# Patient Record
Sex: MALE | Race: BLACK OR AFRICAN AMERICAN | NOT HISPANIC OR LATINO | Employment: FULL TIME | RURAL
[De-identification: names, ages, dates, MRNs, and addresses within clinical notes are randomized per-mention and may not be internally consistent; named-entity substitution may affect disease eponyms.]

---

## 2022-04-09 ENCOUNTER — OFFICE VISIT (OUTPATIENT)
Dept: FAMILY MEDICINE | Facility: CLINIC | Age: 52
End: 2022-04-09
Payer: COMMERCIAL

## 2022-04-09 VITALS
SYSTOLIC BLOOD PRESSURE: 159 MMHG | TEMPERATURE: 99 F | HEART RATE: 68 BPM | OXYGEN SATURATION: 99 % | WEIGHT: 229.38 LBS | BODY MASS INDEX: 34.76 KG/M2 | HEIGHT: 68 IN | DIASTOLIC BLOOD PRESSURE: 93 MMHG

## 2022-04-09 DIAGNOSIS — K59.09 OTHER CONSTIPATION: ICD-10-CM

## 2022-04-09 DIAGNOSIS — M54.42 ACUTE MIDLINE LOW BACK PAIN WITH LEFT-SIDED SCIATICA: Primary | ICD-10-CM

## 2022-04-09 PROCEDURE — 99203 OFFICE O/P NEW LOW 30 MIN: CPT | Mod: 25,,, | Performed by: FAMILY MEDICINE

## 2022-04-09 PROCEDURE — 3080F DIAST BP >= 90 MM HG: CPT | Mod: ,,, | Performed by: FAMILY MEDICINE

## 2022-04-09 PROCEDURE — 1159F PR MEDICATION LIST DOCUMENTED IN MEDICAL RECORD: ICD-10-PCS | Mod: ,,, | Performed by: FAMILY MEDICINE

## 2022-04-09 PROCEDURE — 3080F PR MOST RECENT DIASTOLIC BLOOD PRESSURE >= 90 MM HG: ICD-10-PCS | Mod: ,,, | Performed by: FAMILY MEDICINE

## 2022-04-09 PROCEDURE — 99051 MED SERV EVE/WKEND/HOLIDAY: CPT | Mod: ,,, | Performed by: FAMILY MEDICINE

## 2022-04-09 PROCEDURE — 99051 PR MEDICAL SERVICES, EVE/WKEND/HOLIDAY: ICD-10-PCS | Mod: ,,, | Performed by: FAMILY MEDICINE

## 2022-04-09 PROCEDURE — 1159F MED LIST DOCD IN RCRD: CPT | Mod: ,,, | Performed by: FAMILY MEDICINE

## 2022-04-09 PROCEDURE — 99203 PR OFFICE/OUTPT VISIT, NEW, LEVL III, 30-44 MIN: ICD-10-PCS | Mod: 25,,, | Performed by: FAMILY MEDICINE

## 2022-04-09 PROCEDURE — 3008F PR BODY MASS INDEX (BMI) DOCUMENTED: ICD-10-PCS | Mod: ,,, | Performed by: FAMILY MEDICINE

## 2022-04-09 PROCEDURE — 96372 PR INJECTION,THERAP/PROPH/DIAG2ST, IM OR SUBCUT: ICD-10-PCS | Mod: ,,, | Performed by: FAMILY MEDICINE

## 2022-04-09 PROCEDURE — 3077F PR MOST RECENT SYSTOLIC BLOOD PRESSURE >= 140 MM HG: ICD-10-PCS | Mod: ,,, | Performed by: FAMILY MEDICINE

## 2022-04-09 PROCEDURE — 96372 THER/PROPH/DIAG INJ SC/IM: CPT | Mod: ,,, | Performed by: FAMILY MEDICINE

## 2022-04-09 PROCEDURE — 3077F SYST BP >= 140 MM HG: CPT | Mod: ,,, | Performed by: FAMILY MEDICINE

## 2022-04-09 PROCEDURE — 3008F BODY MASS INDEX DOCD: CPT | Mod: ,,, | Performed by: FAMILY MEDICINE

## 2022-04-09 RX ORDER — PREDNISONE 20 MG/1
TABLET ORAL
Qty: 10 TABLET | Refills: 0 | Status: ON HOLD | OUTPATIENT
Start: 2022-04-09 | End: 2023-01-01

## 2022-04-09 RX ORDER — DEXAMETHASONE SODIUM PHOSPHATE 4 MG/ML
6 INJECTION, SOLUTION INTRA-ARTICULAR; INTRALESIONAL; INTRAMUSCULAR; INTRAVENOUS; SOFT TISSUE
Status: COMPLETED | OUTPATIENT
Start: 2022-04-09 | End: 2022-04-09

## 2022-04-09 RX ORDER — TRAMADOL HYDROCHLORIDE 50 MG/1
50 TABLET ORAL EVERY 6 HOURS
Qty: 15 TABLET | Refills: 0 | Status: ON HOLD | OUTPATIENT
Start: 2022-04-09 | End: 2023-01-01

## 2022-04-09 RX ORDER — TIZANIDINE 4 MG/1
TABLET ORAL
Qty: 30 TABLET | Refills: 2 | Status: ON HOLD | OUTPATIENT
Start: 2022-04-09 | End: 2023-01-01

## 2022-04-09 RX ADMIN — DEXAMETHASONE SODIUM PHOSPHATE 6 MG: 4 INJECTION, SOLUTION INTRA-ARTICULAR; INTRALESIONAL; INTRAMUSCULAR; INTRAVENOUS; SOFT TISSUE at 07:04

## 2023-01-01 ENCOUNTER — ANESTHESIA EVENT (OUTPATIENT)
Dept: SURGERY | Facility: HOSPITAL | Age: 53
DRG: 356 | End: 2023-01-01

## 2023-01-01 ENCOUNTER — ANESTHESIA (OUTPATIENT)
Dept: SURGERY | Facility: HOSPITAL | Age: 53
DRG: 356 | End: 2023-01-01

## 2023-01-01 ENCOUNTER — OFFICE VISIT (OUTPATIENT)
Dept: PRIMARY CARE CLINIC | Facility: CLINIC | Age: 53
End: 2023-01-01
Payer: COMMERCIAL

## 2023-01-01 ENCOUNTER — LAB VISIT (OUTPATIENT)
Dept: PRIMARY CARE CLINIC | Facility: CLINIC | Age: 53
End: 2023-01-01

## 2023-01-01 ENCOUNTER — HOSPITAL ENCOUNTER (OUTPATIENT)
Dept: RADIOLOGY | Facility: HOSPITAL | Age: 53
Discharge: HOME | End: 2023-04-10
Attending: NURSE PRACTITIONER

## 2023-01-01 ENCOUNTER — OFFICE VISIT (OUTPATIENT)
Dept: ORTHOPEDICS | Facility: CLINIC | Age: 53
End: 2023-01-01
Payer: COMMERCIAL

## 2023-01-01 ENCOUNTER — HOSPITAL ENCOUNTER (OUTPATIENT)
Dept: RADIOLOGY | Facility: HOSPITAL | Age: 53
Discharge: HOME | End: 2023-04-12
Attending: ORTHOPAEDIC SURGERY
Payer: COMMERCIAL

## 2023-01-01 ENCOUNTER — CLINICAL SUPPORT (OUTPATIENT)
Dept: PRIMARY CARE CLINIC | Facility: CLINIC | Age: 53
End: 2023-01-01

## 2023-01-01 ENCOUNTER — HOSPITAL ENCOUNTER (INPATIENT)
Facility: HOSPITAL | Age: 53
LOS: 1 days | DRG: 356 | End: 2023-04-22
Attending: INTERNAL MEDICINE | Admitting: INTERNAL MEDICINE

## 2023-01-01 VITALS
BODY MASS INDEX: 34.71 KG/M2 | OXYGEN SATURATION: 98 % | TEMPERATURE: 98 F | SYSTOLIC BLOOD PRESSURE: 126 MMHG | RESPIRATION RATE: 20 BRPM | HEIGHT: 68 IN | HEART RATE: 60 BPM | WEIGHT: 229 LBS | DIASTOLIC BLOOD PRESSURE: 72 MMHG

## 2023-01-01 VITALS
BODY MASS INDEX: 35.61 KG/M2 | HEART RATE: 169 BPM | WEIGHT: 235 LBS | DIASTOLIC BLOOD PRESSURE: 96 MMHG | OXYGEN SATURATION: 71 % | SYSTOLIC BLOOD PRESSURE: 147 MMHG | HEIGHT: 68 IN | TEMPERATURE: 92 F | RESPIRATION RATE: 15 BRPM

## 2023-01-01 VITALS — BODY MASS INDEX: 34.86 KG/M2 | HEIGHT: 68 IN | WEIGHT: 230 LBS

## 2023-01-01 DIAGNOSIS — S82.892A CLOSED FRACTURE OF LEFT ANKLE, INITIAL ENCOUNTER: Primary | ICD-10-CM

## 2023-01-01 DIAGNOSIS — R07.9 CHEST PAIN: ICD-10-CM

## 2023-01-01 DIAGNOSIS — R10.9 ABDOMINAL PAIN, UNSPECIFIED ABDOMINAL LOCATION: ICD-10-CM

## 2023-01-01 DIAGNOSIS — I74.9 EMBOLIC INFARCTION: ICD-10-CM

## 2023-01-01 DIAGNOSIS — N17.9 AKI (ACUTE KIDNEY INJURY): ICD-10-CM

## 2023-01-01 DIAGNOSIS — Z02.83 ENCOUNTER FOR DRUG SCREENING: Primary | ICD-10-CM

## 2023-01-01 DIAGNOSIS — S82.65XA CLOSED NONDISPLACED FRACTURE OF LATERAL MALLEOLUS OF LEFT FIBULA, INITIAL ENCOUNTER: ICD-10-CM

## 2023-01-01 DIAGNOSIS — E87.20 LACTIC ACIDOSIS: ICD-10-CM

## 2023-01-01 DIAGNOSIS — S82.65XA CLOSED NONDISPLACED FRACTURE OF LATERAL MALLEOLUS OF LEFT FIBULA, INITIAL ENCOUNTER: Primary | ICD-10-CM

## 2023-01-01 DIAGNOSIS — Z02.89 ENCOUNTER FOR PHYSICAL EXAMINATION RELATED TO EMPLOYMENT: Primary | ICD-10-CM

## 2023-01-01 DIAGNOSIS — S93.402A MODERATE LEFT ANKLE SPRAIN, INITIAL ENCOUNTER: ICD-10-CM

## 2023-01-01 DIAGNOSIS — S93.402A MODERATE LEFT ANKLE SPRAIN, INITIAL ENCOUNTER: Primary | ICD-10-CM

## 2023-01-01 DIAGNOSIS — Z02.83 ENCOUNTER FOR DRUG SCREENING: ICD-10-CM

## 2023-01-01 DIAGNOSIS — I26.99 BILATERAL PULMONARY EMBOLISM: Primary | ICD-10-CM

## 2023-01-01 DIAGNOSIS — R73.9 HYPERGLYCEMIA, UNSPECIFIED: ICD-10-CM

## 2023-01-01 DIAGNOSIS — I26.99 PULMONARY EMBOLISM: ICD-10-CM

## 2023-01-01 DIAGNOSIS — E66.9 OBESITY (BMI 35.0-39.9 WITHOUT COMORBIDITY): ICD-10-CM

## 2023-01-01 LAB
ABO + RH BLD: NORMAL
ABORH RETYPE: NORMAL
ALBUMIN SERPL BCP-MCNC: 1.6 G/DL (ref 3.5–5)
ALBUMIN SERPL BCP-MCNC: 3.9 G/DL (ref 3.5–5)
ALBUMIN/GLOB SERPL: 0.8 {RATIO}
ALBUMIN/GLOB SERPL: 1 {RATIO}
ALP SERPL-CCNC: 133 U/L (ref 45–115)
ALP SERPL-CCNC: 74 U/L (ref 45–115)
ALT SERPL W P-5'-P-CCNC: 47 U/L (ref 16–61)
ALT SERPL W P-5'-P-CCNC: 6645 U/L (ref 16–61)
ANION GAP SERPL CALCULATED.3IONS-SCNC: 21 MMOL/L (ref 7–16)
ANION GAP SERPL CALCULATED.3IONS-SCNC: 31 MMOL/L (ref 7–16)
ANION GAP SERPL CALCULATED.3IONS-SCNC: 39 MMOL/L (ref 7–16)
AORTIC VALVE CUSP SEPERATION: 2 CM
APTT PPP: 163 SECONDS (ref 25.2–37.3)
APTT PPP: 169.9 SECONDS (ref 25.2–37.3)
APTT PPP: 26 SECONDS (ref 25.2–37.3)
AST SERPL W P-5'-P-CCNC: 56 U/L (ref 15–37)
AST SERPL W P-5'-P-CCNC: 9732 U/L (ref 15–37)
AV MEAN GRADIENT: 39 MMHG
AV PEAK GRADIENT: 10 MMHG
BACTERIA #/AREA URNS HPF: ABNORMAL /HPF
BASOPHILS # BLD AUTO: 0.02 K/UL (ref 0–0.2)
BASOPHILS # BLD AUTO: 0.02 K/UL (ref 0–0.2)
BASOPHILS # BLD AUTO: 0.03 K/UL (ref 0–0.2)
BASOPHILS NFR BLD AUTO: 0.2 % (ref 0–1)
BILIRUB SERPL-MCNC: 0.6 MG/DL (ref ?–1.2)
BILIRUB SERPL-MCNC: 0.7 MG/DL (ref ?–1.2)
BILIRUB UR QL STRIP: NEGATIVE
BLD PROD TYP BPU: NORMAL
BLOOD UNIT EXPIRATION DATE: NORMAL
BLOOD UNIT TYPE CODE: 5100
BSA FOR ECHO PROCEDURE: 2.26 M2
BUN SERPL-MCNC: 19 MG/DL (ref 7–18)
BUN SERPL-MCNC: 22 MG/DL (ref 7–18)
BUN SERPL-MCNC: 23 MG/DL (ref 7–18)
BUN/CREAT SERPL: 11 (ref 6–20)
BUN/CREAT SERPL: 13 (ref 6–20)
BUN/CREAT SERPL: 8 (ref 6–20)
CALCIUM SERPL-MCNC: 10.4 MG/DL (ref 8.5–10.1)
CALCIUM SERPL-MCNC: 9.6 MG/DL (ref 8.5–10.1)
CALCIUM SERPL-MCNC: 9.9 MG/DL (ref 8.5–10.1)
CHLORIDE SERPL-SCNC: 100 MMOL/L (ref 98–107)
CHLORIDE SERPL-SCNC: 102 MMOL/L (ref 98–107)
CHLORIDE SERPL-SCNC: 110 MMOL/L (ref 98–107)
CLARITY UR: ABNORMAL
CO2 SERPL-SCNC: 15 MMOL/L (ref 21–32)
CO2 SERPL-SCNC: 20 MMOL/L (ref 21–32)
CO2 SERPL-SCNC: 20 MMOL/L (ref 21–32)
COARSE GRAN CASTS #/AREA URNS LPF: ABNORMAL /LPF
COLOR UR: YELLOW
CREAT SERPL-MCNC: 1.68 MG/DL (ref 0.7–1.3)
CREAT SERPL-MCNC: 1.75 MG/DL (ref 0.7–1.3)
CREAT SERPL-MCNC: 2.84 MG/DL (ref 0.7–1.3)
CROSSMATCH INTERPRETATION: NORMAL
CV ECHO LV RWT: 0.61 CM
DIFFERENTIAL METHOD BLD: ABNORMAL
DISPENSE STATUS: NORMAL
DOP CALC AO PEAK VEL: 1.6 M/S
DOP CALC AO VTI: 67.44 CM
DOP CALC LVOT AREA: 3.5 CM2
DOP CALC LVOT DIAMETER: 2.1 CM
E WAVE DECELERATION TIME: 158 MSEC
ECHO LV POSTERIOR WALL: 1.11 CM (ref 0.6–1.1)
EGFR (NO RACE VARIABLE) (RUSH/TITUS): 26 ML/MIN/1.73M²
EGFR (NO RACE VARIABLE) (RUSH/TITUS): 47 ML/MIN/1.73M²
EGFR (NO RACE VARIABLE) (RUSH/TITUS): 49 ML/MIN/1.73M²
EJECTION FRACTION: 60 %
EOSINOPHIL # BLD AUTO: 0.01 K/UL (ref 0–0.5)
EOSINOPHIL # BLD AUTO: 0.02 K/UL (ref 0–0.5)
EOSINOPHIL # BLD AUTO: 0.03 K/UL (ref 0–0.5)
EOSINOPHIL NFR BLD AUTO: 0.1 % (ref 1–4)
EOSINOPHIL NFR BLD AUTO: 0.2 % (ref 1–4)
EOSINOPHIL NFR BLD AUTO: 0.3 % (ref 1–4)
ERYTHROCYTE [DISTWIDTH] IN BLOOD BY AUTOMATED COUNT: 12 % (ref 11.5–14.5)
ERYTHROCYTE [DISTWIDTH] IN BLOOD BY AUTOMATED COUNT: 12.4 % (ref 11.5–14.5)
ERYTHROCYTE [DISTWIDTH] IN BLOOD BY AUTOMATED COUNT: 14 % (ref 11.5–14.5)
EST. AVERAGE GLUCOSE BLD GHB EST-MCNC: 90 MG/DL
FRACTIONAL SHORTENING: 30 % (ref 28–44)
GLOBULIN SER-MCNC: 1.6 G/DL (ref 2–4)
GLOBULIN SER-MCNC: 5.1 G/DL (ref 2–4)
GLUCOSE SERPL-MCNC: 105 MG/DL (ref 74–106)
GLUCOSE SERPL-MCNC: 207 MG/DL (ref 74–106)
GLUCOSE SERPL-MCNC: 274 MG/DL (ref 74–106)
GLUCOSE UR STRIP-MCNC: NORMAL MG/DL
HBA1C MFR BLD HPLC: 5.3 % (ref 4.5–6.6)
HCO3 UR-SCNC: 13.5 MMOL/L (ref 21–28)
HCO3 UR-SCNC: 20.1 MMOL/L (ref 21–28)
HCO3 UR-SCNC: 9.6 MMOL/L (ref 21–28)
HCO3 UR-SCNC: ABNORMAL MMOL/L (ref 21–28)
HCT VFR BLD AUTO: 22.8 % (ref 40–54)
HCT VFR BLD AUTO: 44.3 % (ref 40–54)
HCT VFR BLD AUTO: 49.6 % (ref 40–54)
HCT VFR BLD CALC: 25 % (ref 35–51)
HCT VFR BLD CALC: 34 % (ref 35–51)
HCT VFR BLD CALC: 41 % (ref 35–51)
HGB BLD-MCNC: 13.6 G/DL (ref 13.5–18)
HGB BLD-MCNC: 14.4 G/DL (ref 13.5–18)
HGB BLD-MCNC: 6.8 G/DL (ref 13.5–18)
IMM GRANULOCYTES # BLD AUTO: 0.03 K/UL (ref 0–0.04)
IMM GRANULOCYTES # BLD AUTO: 0.22 K/UL (ref 0–0.04)
IMM GRANULOCYTES # BLD AUTO: 0.29 K/UL (ref 0–0.04)
IMM GRANULOCYTES NFR BLD: 0.4 % (ref 0–0.4)
IMM GRANULOCYTES NFR BLD: 1.4 % (ref 0–0.4)
IMM GRANULOCYTES NFR BLD: 2.7 % (ref 0–0.4)
INDIRECT COOMBS: NORMAL
INR BLD: 1.18
INTERVENTRICULAR SEPTUM: 1.16 CM (ref 0.6–1.1)
KETONES UR STRIP-SCNC: NEGATIVE MG/DL
LACTATE SERPL-SCNC: 15 MMOL/L (ref 0.4–2)
LACTATE SERPL-SCNC: 18.8 MMOL/L (ref 0.4–2)
LACTATE SERPL-SCNC: 26.7 MMOL/L (ref 0.4–2)
LACTATE SERPL-SCNC: 8.7 MMOL/L (ref 0.4–2)
LACTATE SERPL-SCNC: 8.8 MMOL/L (ref 0.4–2)
LDH SERPL L TO P-CCNC: >15 MMOL/L (ref 0.3–1.2)
LEFT ATRIUM SIZE: 3.1 CM
LEFT INTERNAL DIMENSION IN SYSTOLE: 2.56 CM (ref 2.1–4)
LEFT VENTRICLE DIASTOLIC VOLUME INDEX: 25.52 ML/M2
LEFT VENTRICLE DIASTOLIC VOLUME: 55.89 ML
LEFT VENTRICLE MASS INDEX: 60 G/M2
LEFT VENTRICLE SYSTOLIC VOLUME INDEX: 10.8 ML/M2
LEFT VENTRICLE SYSTOLIC VOLUME: 23.68 ML
LEFT VENTRICULAR INTERNAL DIMENSION IN DIASTOLE: 3.64 CM (ref 3.5–6)
LEFT VENTRICULAR MASS: 132.22 G
LEUKOCYTE ESTERASE UR QL STRIP: 500
LYMPHOCYTES # BLD AUTO: 1.62 K/UL (ref 1–4.8)
LYMPHOCYTES # BLD AUTO: 1.91 K/UL (ref 1–4.8)
LYMPHOCYTES # BLD AUTO: 2.7 K/UL (ref 1–4.8)
LYMPHOCYTES NFR BLD AUTO: 12.5 % (ref 27–41)
LYMPHOCYTES NFR BLD AUTO: 15 % (ref 27–41)
LYMPHOCYTES NFR BLD AUTO: 32.5 % (ref 27–41)
LYMPHOCYTES NFR BLD MANUAL: 15 % (ref 27–41)
MAGNESIUM SERPL-MCNC: 2.5 MG/DL (ref 1.7–2.3)
MCH RBC QN AUTO: 27.5 PG (ref 27–31)
MCH RBC QN AUTO: 27.6 PG (ref 27–31)
MCH RBC QN AUTO: 28.7 PG (ref 27–31)
MCHC RBC AUTO-ENTMCNC: 29 G/DL (ref 32–36)
MCHC RBC AUTO-ENTMCNC: 29.8 G/DL (ref 32–36)
MCHC RBC AUTO-ENTMCNC: 30.7 G/DL (ref 32–36)
MCV RBC AUTO: 89.9 FL (ref 80–96)
MCV RBC AUTO: 94.8 FL (ref 80–96)
MCV RBC AUTO: 96.2 FL (ref 80–96)
METAMYELOCYTES NFR BLD MANUAL: 4 %
MONOCYTES # BLD AUTO: 0.42 K/UL (ref 0–0.8)
MONOCYTES # BLD AUTO: 0.56 K/UL (ref 0–0.8)
MONOCYTES # BLD AUTO: 1.28 K/UL (ref 0–0.8)
MONOCYTES NFR BLD AUTO: 5.1 % (ref 2–6)
MONOCYTES NFR BLD AUTO: 5.2 % (ref 2–6)
MONOCYTES NFR BLD AUTO: 8.4 % (ref 2–6)
MONOCYTES NFR BLD MANUAL: 4 % (ref 2–6)
MPC BLD CALC-MCNC: 10 FL (ref 9.4–12.4)
MPC BLD CALC-MCNC: 10.2 FL (ref 9.4–12.4)
MPC BLD CALC-MCNC: 10.4 FL (ref 9.4–12.4)
MUCOUS THREADS #/AREA URNS HPF: ABNORMAL /HPF
MV PEAK E VEL: 0.66 M/S
MYELOCYTES NFR BLD MANUAL: 6 %
NEUTROPHILS # BLD AUTO: 11.79 K/UL (ref 1.8–7.7)
NEUTROPHILS # BLD AUTO: 5.12 K/UL (ref 1.8–7.7)
NEUTROPHILS # BLD AUTO: 8.29 K/UL (ref 1.8–7.7)
NEUTROPHILS NFR BLD AUTO: 61.6 % (ref 53–65)
NEUTROPHILS NFR BLD AUTO: 76.6 % (ref 53–65)
NEUTROPHILS NFR BLD AUTO: 77.4 % (ref 53–65)
NEUTS BAND NFR BLD MANUAL: 18 % (ref 1–5)
NEUTS SEG NFR BLD MANUAL: 53 % (ref 50–62)
NITRITE UR QL STRIP: NEGATIVE
NRBC # BLD AUTO: 0 X10E3/UL
NRBC # BLD AUTO: 0.02 X10E3/UL
NRBC # BLD AUTO: 0.7 X10E3/UL
NRBC BLD MANUAL-RTO: 12 /100 WBC
NRBC, AUTO (.00): 0 %
NRBC, AUTO (.00): 0.1 %
NRBC, AUTO (.00): 6.5 %
NT-PROBNP SERPL-MCNC: 1617 PG/ML (ref 1–125)
OVALOCYTES BLD QL SMEAR: ABNORMAL
PCO2 BLDA: 56 MMHG (ref 35–48)
PCO2 BLDA: 56 MMHG (ref 35–48)
PCO2 BLDA: 60 MMHG (ref 35–48)
PCO2 BLDA: 78 MMHG (ref 35–48)
PH SMN: 6.84 [PH] (ref 7.35–7.45)
PH SMN: 6.96 [PH] (ref 7.35–7.45)
PH SMN: 7.02 [PH] (ref 7.35–7.45)
PH SMN: <6.8 [PH] (ref 7.35–7.45)
PH UR STRIP: 6.5 PH UNITS
PISA TR MAX VEL: 4.06 M/S
PLATELET # BLD AUTO: 158 K/UL (ref 150–400)
PLATELET # BLD AUTO: 167 K/UL (ref 150–400)
PLATELET # BLD AUTO: 45 K/UL (ref 150–400)
PLATELET MORPHOLOGY: ABNORMAL
PO2 BLDA: 112 MMHG (ref 83–108)
PO2 BLDA: 174 MMHG (ref 83–108)
PO2 BLDA: 175 MMHG (ref 83–108)
PO2 BLDA: 53 MMHG (ref 83–108)
POC BASE EXCESS: -11.9 MMOL/L (ref -2–3)
POC BASE EXCESS: -17.4 MMOL/L (ref -2–3)
POC BASE EXCESS: -23.8 MMOL/L (ref -2–3)
POC BASE EXCESS: ABNORMAL MMOL/L (ref -2–3)
POC CO2: 11.3 MMOL/L
POC CO2: 15.3 MMOL/L
POC CO2: ABNORMAL MMOL/L
POC IONIZED CALCIUM: 0.98 MMOL/L (ref 1.15–1.35)
POC IONIZED CALCIUM: 1.13 MMOL/L (ref 1.15–1.35)
POC IONIZED CALCIUM: 1.3 MMOL/L (ref 1.15–1.35)
POC SATURATED O2: 67 % (ref 95–98)
POC SATURATED O2: 98 % (ref 95–98)
POC SATURATED O2: 99 % (ref 95–98)
POC SATURATED O2: ABNORMAL % (ref 95–98)
POCT GLUCOSE: 64 MG/DL (ref 60–95)
POCT GLUCOSE: 71 MG/DL (ref 60–95)
POCT GLUCOSE: 80 MG/DL (ref 60–95)
POLYCHROMASIA BLD QL SMEAR: ABNORMAL
POTASSIUM BLD-SCNC: 4.6 MMOL/L (ref 3.4–4.5)
POTASSIUM BLD-SCNC: 4.8 MMOL/L (ref 3.4–4.5)
POTASSIUM BLD-SCNC: 4.8 MMOL/L (ref 3.4–4.5)
POTASSIUM SERPL-SCNC: 3.4 MMOL/L (ref 3.5–5.1)
POTASSIUM SERPL-SCNC: 3.8 MMOL/L (ref 3.5–5.1)
POTASSIUM SERPL-SCNC: 4.5 MMOL/L (ref 3.5–5.1)
PROT SERPL-MCNC: 3.2 G/DL (ref 6.4–8.2)
PROT SERPL-MCNC: 9 G/DL (ref 6.4–8.2)
PROT UR QL STRIP: 100
PROTHROMBIN TIME: 14.5 SECONDS (ref 11.7–14.7)
RBC # BLD AUTO: 2.37 M/UL (ref 4.6–6.2)
RBC # BLD AUTO: 4.93 M/UL (ref 4.6–6.2)
RBC # BLD AUTO: 5.23 M/UL (ref 4.6–6.2)
RBC # UR STRIP: ABNORMAL /UL
RBC #/AREA URNS HPF: ABNORMAL /HPF
RH BLD: NORMAL
RIGHT ATRIAL AREA: 10.9 CM2
RIGHT VENTRICULAR END-DIASTOLIC DIMENSION: 2.65 CM
SARS-COV-2 RDRP RESP QL NAA+PROBE: NEGATIVE
SODIUM BLD-SCNC: 146 MMOL/L (ref 136–145)
SODIUM BLD-SCNC: 148 MMOL/L (ref 136–145)
SODIUM BLD-SCNC: 152 MMOL/L (ref 136–145)
SODIUM SERPL-SCNC: 138 MMOL/L (ref 136–145)
SODIUM SERPL-SCNC: 144 MMOL/L (ref 136–145)
SODIUM SERPL-SCNC: 164 MMOL/L (ref 136–145)
SP GR UR STRIP: 1.01
SPECIMEN OUTDATE: NORMAL
SQUAMOUS #/AREA URNS LPF: ABNORMAL /LPF
TR MAX PG: 66 MMHG
TRICHOMONAS #/AREA URNS HPF: ABNORMAL /HPF
TROPONIN I SERPL HS-MCNC: 1903.3 PG/ML
TROPONIN I SERPL HS-MCNC: 4770.9 PG/ML
TROPONIN I SERPL HS-MCNC: 4786.4 PG/ML
UNIT NUMBER: NORMAL
UROBILINOGEN UR STRIP-ACNC: NORMAL MG/DL
WBC # BLD AUTO: 10.81 K/UL (ref 4.5–11)
WBC # BLD AUTO: 15.24 K/UL (ref 4.5–11)
WBC # BLD AUTO: 8.31 K/UL (ref 4.5–11)
WBC #/AREA URNS HPF: ABNORMAL /HPF
YEAST #/AREA URNS HPF: ABNORMAL /HPF

## 2023-01-01 PROCEDURE — 99255 PR INITIAL INPATIENT CONSULT,LEVL V: ICD-10-PCS | Mod: ,,, | Performed by: STUDENT IN AN ORGANIZED HEALTH CARE EDUCATION/TRAINING PROGRAM

## 2023-01-01 PROCEDURE — P9045 ALBUMIN (HUMAN), 5%, 250 ML: HCPCS | Mod: JZ,JG | Performed by: NURSE ANESTHETIST, CERTIFIED REGISTERED

## 2023-01-01 PROCEDURE — 96374 THER/PROPH/DIAG INJ IV PUSH: CPT

## 2023-01-01 PROCEDURE — 86923 COMPATIBILITY TEST ELECTRIC: CPT | Mod: 91 | Performed by: ANESTHESIOLOGY

## 2023-01-01 PROCEDURE — 80053 COMPREHEN METABOLIC PANEL: CPT | Performed by: NURSE PRACTITIONER

## 2023-01-01 PROCEDURE — 47600 PR REMOVAL GALLBLADDER: ICD-10-PCS | Mod: 51,,, | Performed by: SURGERY

## 2023-01-01 PROCEDURE — 36620 ARTERIAL: ICD-10-PCS | Mod: 59,,, | Performed by: ANESTHESIOLOGY

## 2023-01-01 PROCEDURE — 99000 SPECIMEN HANDLING OFFICE-LAB: CPT | Mod: ,,, | Performed by: NURSE PRACTITIONER

## 2023-01-01 PROCEDURE — 94002 VENT MGMT INPAT INIT DAY: CPT

## 2023-01-01 PROCEDURE — 27200700 HC TUBE, ENDOTRACHEAL NASAL RAE: Performed by: NURSE ANESTHETIST, CERTIFIED REGISTERED

## 2023-01-01 PROCEDURE — 82075 ASSAY OF BREATH ETHANOL: CPT | Mod: ,,, | Performed by: NURSE PRACTITIONER

## 2023-01-01 PROCEDURE — 85025 COMPLETE CBC W/AUTO DIFF WBC: CPT | Performed by: NURSE PRACTITIONER

## 2023-01-01 PROCEDURE — 99900035 HC TECH TIME PER 15 MIN (STAT)

## 2023-01-01 PROCEDURE — 63600175 PHARM REV CODE 636 W HCPCS: Performed by: HOSPITALIST

## 2023-01-01 PROCEDURE — 87635 SARS-COV-2 COVID-19 AMP PRB: CPT | Performed by: NURSE PRACTITIONER

## 2023-01-01 PROCEDURE — 27201423 OPTIME MED/SURG SUP & DEVICES STERILE SUPPLY: Performed by: SURGERY

## 2023-01-01 PROCEDURE — 84132 ASSAY OF SERUM POTASSIUM: CPT

## 2023-01-01 PROCEDURE — 37000008 HC ANESTHESIA 1ST 15 MINUTES: Performed by: SURGERY

## 2023-01-01 PROCEDURE — 25000003 PHARM REV CODE 250: Performed by: NURSE PRACTITIONER

## 2023-01-01 PROCEDURE — 83605 ASSAY OF LACTIC ACID: CPT | Performed by: NURSE PRACTITIONER

## 2023-01-01 PROCEDURE — 99499 PR PHYSICAL - BASIC NON DOT/CDL: ICD-10-PCS | Mod: ,,, | Performed by: NURSE PRACTITIONER

## 2023-01-01 PROCEDURE — 96361 HYDRATE IV INFUSION ADD-ON: CPT

## 2023-01-01 PROCEDURE — 99214 PR OFFICE/OUTPT VISIT, EST, LEVL IV, 30-39 MIN: ICD-10-PCS | Mod: ,,, | Performed by: NURSE PRACTITIONER

## 2023-01-01 PROCEDURE — 63600175 PHARM REV CODE 636 W HCPCS: Performed by: NURSE PRACTITIONER

## 2023-01-01 PROCEDURE — 88304 TISSUE EXAM BY PATHOLOGIST: CPT | Mod: 26,,, | Performed by: PATHOLOGY

## 2023-01-01 PROCEDURE — 25500020 PHARM REV CODE 255: Performed by: NURSE PRACTITIONER

## 2023-01-01 PROCEDURE — 84484 ASSAY OF TROPONIN QUANT: CPT | Performed by: NURSE PRACTITIONER

## 2023-01-01 PROCEDURE — 99255 IP/OBS CONSLTJ NEW/EST HI 80: CPT | Mod: ,,, | Performed by: STUDENT IN AN ORGANIZED HEALTH CARE EDUCATION/TRAINING PROGRAM

## 2023-01-01 PROCEDURE — 84484 ASSAY OF TROPONIN QUANT: CPT

## 2023-01-01 PROCEDURE — 37000009 HC ANESTHESIA EA ADD 15 MINS: Performed by: SURGERY

## 2023-01-01 PROCEDURE — 83735 ASSAY OF MAGNESIUM: CPT

## 2023-01-01 PROCEDURE — 99204 PR OFFICE/OUTPT VISIT, NEW, LEVL IV, 45-59 MIN: ICD-10-PCS | Mod: S$PBB,,, | Performed by: ORTHOPAEDIC SURGERY

## 2023-01-01 PROCEDURE — 47600 CHOLECYSTECTOMY: CPT | Mod: 80,51,, | Performed by: SURGERY

## 2023-01-01 PROCEDURE — 99291 PR CRITICAL CARE, E/M 30-74 MINUTES: ICD-10-PCS | Mod: 25,,, | Performed by: NURSE PRACTITIONER

## 2023-01-01 PROCEDURE — P9016 RBC LEUKOCYTES REDUCED: HCPCS | Performed by: ANESTHESIOLOGY

## 2023-01-01 PROCEDURE — D9220A PRA ANESTHESIA: ICD-10-PCS | Mod: ,,, | Performed by: ANESTHESIOLOGY

## 2023-01-01 PROCEDURE — 34151 REMOVAL OF ARTERY CLOT: CPT | Mod: 80,,, | Performed by: SURGERY

## 2023-01-01 PROCEDURE — 92950 PR HEART/LUNG RESUSCITATION (CPR): ICD-10-PCS | Mod: ,,, | Performed by: NURSE PRACTITIONER

## 2023-01-01 PROCEDURE — 36430 TRANSFUSION BLD/BLD COMPNT: CPT

## 2023-01-01 PROCEDURE — 93010 ELECTROCARDIOGRAM REPORT: CPT | Mod: ,,, | Performed by: STUDENT IN AN ORGANIZED HEALTH CARE EDUCATION/TRAINING PROGRAM

## 2023-01-01 PROCEDURE — D9220A PRA ANESTHESIA: Mod: ,,, | Performed by: ANESTHESIOLOGY

## 2023-01-01 PROCEDURE — 93010 EKG 12-LEAD: ICD-10-PCS | Mod: ,,, | Performed by: STUDENT IN AN ORGANIZED HEALTH CARE EDUCATION/TRAINING PROGRAM

## 2023-01-01 PROCEDURE — 20000000 HC ICU ROOM

## 2023-01-01 PROCEDURE — 99285 PR EMERGENCY DEPT VISIT,LEVEL V: ICD-10-PCS | Mod: ,,, | Performed by: NURSE PRACTITIONER

## 2023-01-01 PROCEDURE — 85610 PROTHROMBIN TIME: CPT | Performed by: NURSE PRACTITIONER

## 2023-01-01 PROCEDURE — 63600175 PHARM REV CODE 636 W HCPCS: Performed by: SURGERY

## 2023-01-01 PROCEDURE — 82803 BLOOD GASES ANY COMBINATION: CPT

## 2023-01-01 PROCEDURE — 99499 UNLISTED E&M SERVICE: CPT | Mod: ,,, | Performed by: NURSE PRACTITIONER

## 2023-01-01 PROCEDURE — 25000003 PHARM REV CODE 250

## 2023-01-01 PROCEDURE — 82947 ASSAY GLUCOSE BLOOD QUANT: CPT

## 2023-01-01 PROCEDURE — 88304 SURGICAL PATHOLOGY: ICD-10-PCS | Mod: 26,,, | Performed by: PATHOLOGY

## 2023-01-01 PROCEDURE — 99000 PR SPECIMEN HANDLING,DR OFF->LAB: ICD-10-PCS | Mod: ,,, | Performed by: NURSE PRACTITIONER

## 2023-01-01 PROCEDURE — 99204 OFFICE O/P NEW MOD 45 MIN: CPT | Mod: S$PBB,,, | Performed by: ORTHOPAEDIC SURGERY

## 2023-01-01 PROCEDURE — 99223 1ST HOSP IP/OBS HIGH 75: CPT | Mod: ,,, | Performed by: HOSPITALIST

## 2023-01-01 PROCEDURE — 99223 PR INITIAL HOSPITAL CARE,LEVL III: ICD-10-PCS | Mod: ,,, | Performed by: HOSPITALIST

## 2023-01-01 PROCEDURE — 92950 HEART/LUNG RESUSCITATION CPR: CPT | Mod: ,,, | Performed by: NURSE PRACTITIONER

## 2023-01-01 PROCEDURE — 27000165 HC TUBE, ETT CUFFED: Performed by: NURSE ANESTHETIST, CERTIFIED REGISTERED

## 2023-01-01 PROCEDURE — 80048 BASIC METABOLIC PNL TOTAL CA: CPT | Mod: XB

## 2023-01-01 PROCEDURE — 81001 URINALYSIS AUTO W/SCOPE: CPT | Performed by: NURSE PRACTITIONER

## 2023-01-01 PROCEDURE — 34151 PR REMV ART CLOT VISC,ABD INCIS: ICD-10-PCS | Mod: ,,, | Performed by: SURGERY

## 2023-01-01 PROCEDURE — 27000221 HC OXYGEN, UP TO 24 HOURS

## 2023-01-01 PROCEDURE — 99292 PR CRITICAL CARE, ADDL 30 MIN: ICD-10-PCS | Mod: 25,,, | Performed by: NURSE PRACTITIONER

## 2023-01-01 PROCEDURE — 85730 THROMBOPLASTIN TIME PARTIAL: CPT | Performed by: INTERNAL MEDICINE

## 2023-01-01 PROCEDURE — 73610 X-RAY EXAM OF ANKLE: CPT | Mod: 26,LT,, | Performed by: RADIOLOGY

## 2023-01-01 PROCEDURE — 73610 X-RAY EXAM OF ANKLE: CPT | Mod: TC,LT

## 2023-01-01 PROCEDURE — 99285 EMERGENCY DEPT VISIT HI MDM: CPT | Mod: 25

## 2023-01-01 PROCEDURE — 47600 PR REMOVAL GALLBLADDER: ICD-10-PCS | Mod: 80,51,, | Performed by: SURGERY

## 2023-01-01 PROCEDURE — P9016 RBC LEUKOCYTES REDUCED: HCPCS

## 2023-01-01 PROCEDURE — 63600175 PHARM REV CODE 636 W HCPCS: Performed by: NURSE ANESTHETIST, CERTIFIED REGISTERED

## 2023-01-01 PROCEDURE — 36620 INSERTION CATHETER ARTERY: CPT | Mod: 59,,, | Performed by: ANESTHESIOLOGY

## 2023-01-01 PROCEDURE — 27201142 HC SET RADIAL ARTERY: Performed by: NURSE ANESTHETIST, CERTIFIED REGISTERED

## 2023-01-01 PROCEDURE — C1757 CATH, THROMBECTOMY/EMBOLECT: HCPCS | Performed by: SURGERY

## 2023-01-01 PROCEDURE — 85014 HEMATOCRIT: CPT

## 2023-01-01 PROCEDURE — 27000689 HC BLADE LARYNGOSCOPE ANY SIZE: Performed by: NURSE ANESTHETIST, CERTIFIED REGISTERED

## 2023-01-01 PROCEDURE — 99292 CRITICAL CARE ADDL 30 MIN: CPT | Mod: 25,,, | Performed by: NURSE PRACTITIONER

## 2023-01-01 PROCEDURE — 99213 OFFICE O/P EST LOW 20 MIN: CPT | Mod: PBBFAC | Performed by: ORTHOPAEDIC SURGERY

## 2023-01-01 PROCEDURE — 36556 INSERT NON-TUNNEL CV CATH: CPT | Mod: RT,,, | Performed by: SURGERY

## 2023-01-01 PROCEDURE — 85730 THROMBOPLASTIN TIME PARTIAL: CPT | Performed by: NURSE PRACTITIONER

## 2023-01-01 PROCEDURE — 27000655: Performed by: NURSE ANESTHETIST, CERTIFIED REGISTERED

## 2023-01-01 PROCEDURE — 73610 XR ANKLE COMPLETE 3 VIEW LEFT: ICD-10-PCS | Mod: 26,LT,, | Performed by: RADIOLOGY

## 2023-01-01 PROCEDURE — 36556 PR INSERT NON-TUNNEL CV CATH 5+ YRS OLD: ICD-10-PCS | Mod: RT,,, | Performed by: SURGERY

## 2023-01-01 PROCEDURE — 83036 HEMOGLOBIN GLYCOSYLATED A1C: CPT

## 2023-01-01 PROCEDURE — 36000706: Performed by: SURGERY

## 2023-01-01 PROCEDURE — 86900 BLOOD TYPING SEROLOGIC ABO: CPT | Performed by: INTERNAL MEDICINE

## 2023-01-01 PROCEDURE — 34151 REMOVAL OF ARTERY CLOT: CPT | Mod: ,,, | Performed by: SURGERY

## 2023-01-01 PROCEDURE — 25000003 PHARM REV CODE 250: Performed by: HOSPITALIST

## 2023-01-01 PROCEDURE — 25500020 PHARM REV CODE 255: Performed by: INTERNAL MEDICINE

## 2023-01-01 PROCEDURE — 96376 TX/PRO/DX INJ SAME DRUG ADON: CPT

## 2023-01-01 PROCEDURE — 88304 TISSUE EXAM BY PATHOLOGIST: CPT | Mod: TC,SUR | Performed by: SURGERY

## 2023-01-01 PROCEDURE — 99285 EMERGENCY DEPT VISIT HI MDM: CPT | Mod: ,,, | Performed by: NURSE PRACTITIONER

## 2023-01-01 PROCEDURE — 25000003 PHARM REV CODE 250: Performed by: EMERGENCY MEDICINE

## 2023-01-01 PROCEDURE — 99291 CRITICAL CARE FIRST HOUR: CPT | Mod: 25,,, | Performed by: NURSE PRACTITIONER

## 2023-01-01 PROCEDURE — 47600 CHOLECYSTECTOMY: CPT | Mod: 51,,, | Performed by: SURGERY

## 2023-01-01 PROCEDURE — 83605 ASSAY OF LACTIC ACID: CPT

## 2023-01-01 PROCEDURE — 84295 ASSAY OF SERUM SODIUM: CPT

## 2023-01-01 PROCEDURE — 93005 ELECTROCARDIOGRAM TRACING: CPT

## 2023-01-01 PROCEDURE — 25000003 PHARM REV CODE 250: Performed by: NURSE ANESTHETIST, CERTIFIED REGISTERED

## 2023-01-01 PROCEDURE — 82330 ASSAY OF CALCIUM: CPT

## 2023-01-01 PROCEDURE — 83880 ASSAY OF NATRIURETIC PEPTIDE: CPT | Performed by: NURSE PRACTITIONER

## 2023-01-01 PROCEDURE — 36000707: Performed by: SURGERY

## 2023-01-01 PROCEDURE — 73610 X-RAY EXAM OF ANKLE: CPT | Mod: 26,LT,, | Performed by: ORTHOPAEDIC SURGERY

## 2023-01-01 PROCEDURE — 86923 COMPATIBILITY TEST ELECTRIC: CPT | Mod: 91

## 2023-01-01 PROCEDURE — 25000003 PHARM REV CODE 250: Performed by: INTERNAL MEDICINE

## 2023-01-01 PROCEDURE — 27200966 HC CLOSED SUCTION SYSTEM

## 2023-01-01 PROCEDURE — 27000716 HC OXISENSOR PROBE, ANY SIZE: Performed by: NURSE ANESTHETIST, CERTIFIED REGISTERED

## 2023-01-01 PROCEDURE — 82075 CHG ASSAY OF BREATH ETHANOL: ICD-10-PCS | Mod: ,,, | Performed by: NURSE PRACTITIONER

## 2023-01-01 PROCEDURE — 73610 XR ANKLE COMPLETE 3 VIEW LEFT: ICD-10-PCS | Mod: 26,LT,, | Performed by: ORTHOPAEDIC SURGERY

## 2023-01-01 PROCEDURE — 34151 PR REMV ART CLOT VISC,ABD INCIS: ICD-10-PCS | Mod: 80,,, | Performed by: SURGERY

## 2023-01-01 PROCEDURE — 27000510 HC BLANKET BAIR HUGGER ANY SIZE: Performed by: NURSE ANESTHETIST, CERTIFIED REGISTERED

## 2023-01-01 PROCEDURE — 99214 OFFICE O/P EST MOD 30 MIN: CPT | Mod: ,,, | Performed by: NURSE PRACTITIONER

## 2023-01-01 RX ORDER — SODIUM BICARBONATE 1 MEQ/ML
SYRINGE (ML) INTRAVENOUS
Status: DISCONTINUED | OUTPATIENT
Start: 2023-01-01 | End: 2023-01-01

## 2023-01-01 RX ORDER — GUAIFENESIN/DEXTROMETHORPHAN 100-10MG/5
10 SYRUP ORAL EVERY 6 HOURS PRN
Status: DISCONTINUED | OUTPATIENT
Start: 2023-01-01 | End: 2023-01-01 | Stop reason: HOSPADM

## 2023-01-01 RX ORDER — NOREPINEPHRINE BITARTRATE/D5W 4MG/250ML
0-3 PLASTIC BAG, INJECTION (ML) INTRAVENOUS CONTINUOUS
Status: DISCONTINUED | OUTPATIENT
Start: 2023-01-01 | End: 2023-01-01

## 2023-01-01 RX ORDER — BISACODYL 5 MG
10 TABLET, DELAYED RELEASE (ENTERIC COATED) ORAL DAILY PRN
Status: DISCONTINUED | OUTPATIENT
Start: 2023-01-01 | End: 2023-01-01 | Stop reason: HOSPADM

## 2023-01-01 RX ORDER — HYDROCODONE BITARTRATE AND ACETAMINOPHEN 500; 5 MG/1; MG/1
TABLET ORAL
Status: DISCONTINUED | OUTPATIENT
Start: 2023-01-01 | End: 2023-01-01 | Stop reason: HOSPADM

## 2023-01-01 RX ORDER — MELOXICAM 7.5 MG/1
7.5 TABLET ORAL DAILY
Qty: 10 TABLET | Refills: 0 | Status: ON HOLD | OUTPATIENT
Start: 2023-01-01 | End: 2023-01-01

## 2023-01-01 RX ORDER — EPINEPHRINE 0.1 MG/ML
INJECTION INTRAVENOUS CODE/TRAUMA/SEDATION MEDICATION
Status: COMPLETED | OUTPATIENT
Start: 2023-01-01 | End: 2023-01-01

## 2023-01-01 RX ORDER — SODIUM CHLORIDE 0.9 % (FLUSH) 0.9 %
10 SYRINGE (ML) INJECTION EVERY 12 HOURS PRN
Status: DISCONTINUED | OUTPATIENT
Start: 2023-01-01 | End: 2023-01-01 | Stop reason: HOSPADM

## 2023-01-01 RX ORDER — ACETAMINOPHEN 500 MG
1000 TABLET ORAL EVERY 6 HOURS PRN
Status: DISCONTINUED | OUTPATIENT
Start: 2023-01-01 | End: 2023-01-01 | Stop reason: HOSPADM

## 2023-01-01 RX ORDER — MUPIROCIN 20 MG/G
OINTMENT TOPICAL 2 TIMES DAILY
Status: DISCONTINUED | OUTPATIENT
Start: 2023-01-01 | End: 2023-01-01 | Stop reason: HOSPADM

## 2023-01-01 RX ORDER — MORPHINE SULFATE 2 MG/ML
2 INJECTION, SOLUTION INTRAMUSCULAR; INTRAVENOUS
Status: COMPLETED | OUTPATIENT
Start: 2023-01-01 | End: 2023-01-01

## 2023-01-01 RX ORDER — TRAZODONE HYDROCHLORIDE 50 MG/1
50 TABLET ORAL NIGHTLY PRN
Status: DISCONTINUED | OUTPATIENT
Start: 2023-01-01 | End: 2023-01-01 | Stop reason: HOSPADM

## 2023-01-01 RX ORDER — PHENYLEPHRINE HYDROCHLORIDE 10 MG/ML
INJECTION INTRAVENOUS
Status: DISCONTINUED | OUTPATIENT
Start: 2023-01-01 | End: 2023-01-01

## 2023-01-01 RX ORDER — HEPARIN SODIUM 1000 [USP'U]/ML
INJECTION, SOLUTION INTRAVENOUS; SUBCUTANEOUS
Status: DISCONTINUED | OUTPATIENT
Start: 2023-01-01 | End: 2023-01-01

## 2023-01-01 RX ORDER — SODIUM BICARBONATE 1 MEQ/ML
SYRINGE (ML) INTRAVENOUS
Status: COMPLETED
Start: 2023-01-01 | End: 2023-01-01

## 2023-01-01 RX ORDER — GLUCAGON 1 MG
1 KIT INJECTION
Status: DISCONTINUED | OUTPATIENT
Start: 2023-01-01 | End: 2023-01-01 | Stop reason: HOSPADM

## 2023-01-01 RX ORDER — VECURONIUM BROMIDE FOR INJECTION 1 MG/ML
INJECTION, POWDER, LYOPHILIZED, FOR SOLUTION INTRAVENOUS
Status: DISCONTINUED | OUTPATIENT
Start: 2023-01-01 | End: 2023-01-01

## 2023-01-01 RX ORDER — FENTANYL CITRATE 50 UG/ML
INJECTION, SOLUTION INTRAMUSCULAR; INTRAVENOUS
Status: DISCONTINUED | OUTPATIENT
Start: 2023-01-01 | End: 2023-01-01

## 2023-01-01 RX ORDER — EPINEPHRINE 1 MG/ML
INJECTION, SOLUTION, CONCENTRATE INTRAVENOUS
Status: DISCONTINUED | OUTPATIENT
Start: 2023-01-01 | End: 2023-01-01

## 2023-01-01 RX ORDER — HEPARIN SODIUM 5000 [USP'U]/ML
INJECTION, SOLUTION INTRAVENOUS; SUBCUTANEOUS
Status: DISCONTINUED | OUTPATIENT
Start: 2023-01-01 | End: 2023-01-01 | Stop reason: HOSPADM

## 2023-01-01 RX ORDER — PROPOFOL 10 MG/ML
VIAL (ML) INTRAVENOUS
Status: DISCONTINUED | OUTPATIENT
Start: 2023-01-01 | End: 2023-01-01

## 2023-01-01 RX ORDER — ALBUMIN HUMAN 50 G/1000ML
SOLUTION INTRAVENOUS CONTINUOUS PRN
Status: DISCONTINUED | OUTPATIENT
Start: 2023-01-01 | End: 2023-01-01

## 2023-01-01 RX ORDER — FENTANYL CITRATE 50 UG/ML
125 INJECTION, SOLUTION INTRAMUSCULAR; INTRAVENOUS
Status: DISCONTINUED | OUTPATIENT
Start: 2023-01-01 | End: 2023-01-01 | Stop reason: HOSPADM

## 2023-01-01 RX ORDER — ROCURONIUM BROMIDE 10 MG/ML
INJECTION, SOLUTION INTRAVENOUS
Status: DISCONTINUED | OUTPATIENT
Start: 2023-01-01 | End: 2023-01-01

## 2023-01-01 RX ORDER — SIMETHICONE 80 MG
1 TABLET,CHEWABLE ORAL 3 TIMES DAILY PRN
Status: DISCONTINUED | OUTPATIENT
Start: 2023-01-01 | End: 2023-01-01 | Stop reason: HOSPADM

## 2023-01-01 RX ORDER — HEPARIN SODIUM,PORCINE/D5W 25000/250
0-40 INTRAVENOUS SOLUTION INTRAVENOUS CONTINUOUS
Status: DISCONTINUED | OUTPATIENT
Start: 2023-01-01 | End: 2023-01-01

## 2023-01-01 RX ORDER — DOBUTAMINE HYDROCHLORIDE 400 MG/100ML
0-20 INJECTION INTRAVENOUS CONTINUOUS
Status: DISCONTINUED | OUTPATIENT
Start: 2023-01-01 | End: 2023-01-01 | Stop reason: HOSPADM

## 2023-01-01 RX ORDER — ONDANSETRON 2 MG/ML
INJECTION INTRAMUSCULAR; INTRAVENOUS
Status: DISCONTINUED | OUTPATIENT
Start: 2023-01-01 | End: 2023-01-01

## 2023-01-01 RX ORDER — NALOXONE HCL 0.4 MG/ML
0.02 VIAL (ML) INJECTION
Status: DISCONTINUED | OUTPATIENT
Start: 2023-01-01 | End: 2023-01-01 | Stop reason: HOSPADM

## 2023-01-01 RX ORDER — EPHEDRINE SULFATE 50 MG/ML
INJECTION, SOLUTION INTRAVENOUS
Status: DISCONTINUED | OUTPATIENT
Start: 2023-01-01 | End: 2023-01-01

## 2023-01-01 RX ORDER — PANTOPRAZOLE SODIUM 40 MG/10ML
40 INJECTION, POWDER, LYOPHILIZED, FOR SOLUTION INTRAVENOUS DAILY
Status: DISCONTINUED | OUTPATIENT
Start: 2023-01-01 | End: 2023-01-01 | Stop reason: HOSPADM

## 2023-01-01 RX ORDER — EPINEPHRINE 0.1 MG/ML
INJECTION INTRAVENOUS
Status: DISCONTINUED | OUTPATIENT
Start: 2023-01-01 | End: 2023-01-01

## 2023-01-01 RX ORDER — DOBUTAMINE HYDROCHLORIDE 400 MG/100ML
INJECTION INTRAVENOUS CONTINUOUS PRN
Status: DISCONTINUED | OUTPATIENT
Start: 2023-01-01 | End: 2023-01-01

## 2023-01-01 RX ORDER — MIDAZOLAM HYDROCHLORIDE 1 MG/ML
INJECTION INTRAMUSCULAR; INTRAVENOUS
Status: DISCONTINUED | OUTPATIENT
Start: 2023-01-01 | End: 2023-01-01

## 2023-01-01 RX ORDER — LIDOCAINE HYDROCHLORIDE 20 MG/ML
INJECTION, SOLUTION EPIDURAL; INFILTRATION; INTRACAUDAL; PERINEURAL
Status: DISCONTINUED | OUTPATIENT
Start: 2023-01-01 | End: 2023-01-01

## 2023-01-01 RX ORDER — DEXTROSE 40 %
30 GEL (GRAM) ORAL
Status: DISCONTINUED | OUTPATIENT
Start: 2023-01-01 | End: 2023-01-01 | Stop reason: HOSPADM

## 2023-01-01 RX ORDER — MORPHINE SULFATE 4 MG/ML
4 INJECTION, SOLUTION INTRAMUSCULAR; INTRAVENOUS EVERY 4 HOURS PRN
Status: DISCONTINUED | OUTPATIENT
Start: 2023-01-01 | End: 2023-01-01

## 2023-01-01 RX ORDER — INSULIN ASPART 100 [IU]/ML
0-5 INJECTION, SOLUTION INTRAVENOUS; SUBCUTANEOUS
Status: DISCONTINUED | OUTPATIENT
Start: 2023-01-01 | End: 2023-01-01 | Stop reason: HOSPADM

## 2023-01-01 RX ORDER — FENTANYL CITRATE 50 UG/ML
100 INJECTION, SOLUTION INTRAMUSCULAR; INTRAVENOUS
Status: DISCONTINUED | OUTPATIENT
Start: 2023-01-01 | End: 2023-01-01

## 2023-01-01 RX ORDER — ONDANSETRON 2 MG/ML
8 INJECTION INTRAMUSCULAR; INTRAVENOUS EVERY 6 HOURS PRN
Status: DISCONTINUED | OUTPATIENT
Start: 2023-01-01 | End: 2023-01-01 | Stop reason: HOSPADM

## 2023-01-01 RX ORDER — DEXTROSE 40 %
15 GEL (GRAM) ORAL
Status: DISCONTINUED | OUTPATIENT
Start: 2023-01-01 | End: 2023-01-01 | Stop reason: HOSPADM

## 2023-01-01 RX ORDER — VASOPRESSIN 20 [USP'U]/ML
INJECTION, SOLUTION INTRAMUSCULAR; SUBCUTANEOUS
Status: DISCONTINUED | OUTPATIENT
Start: 2023-01-01 | End: 2023-01-01

## 2023-01-01 RX ORDER — ASPIRIN 325 MG
325 TABLET ORAL
Status: COMPLETED | OUTPATIENT
Start: 2023-01-01 | End: 2023-01-01

## 2023-01-01 RX ORDER — ETOMIDATE 2 MG/ML
INJECTION INTRAVENOUS
Status: DISCONTINUED | OUTPATIENT
Start: 2023-01-01 | End: 2023-01-01

## 2023-01-01 RX ORDER — INDOMETHACIN 25 MG/1
100 CAPSULE ORAL ONCE
Status: COMPLETED | OUTPATIENT
Start: 2023-01-01 | End: 2023-01-01

## 2023-01-01 RX ORDER — DOPAMINE HYDROCHLORIDE 320 MG/100ML
0-20 INJECTION, SOLUTION INTRAVENOUS CONTINUOUS
Status: DISCONTINUED | OUTPATIENT
Start: 2023-01-01 | End: 2023-01-01

## 2023-01-01 RX ORDER — DIPHENHYDRAMINE HYDROCHLORIDE 50 MG/ML
50 INJECTION INTRAMUSCULAR; INTRAVENOUS EVERY 6 HOURS PRN
Status: DISCONTINUED | OUTPATIENT
Start: 2023-01-01 | End: 2023-01-01 | Stop reason: HOSPADM

## 2023-01-01 RX ADMIN — SODIUM BICARBONATE 50 MEQ: 84 INJECTION, SOLUTION INTRAVENOUS at 09:04

## 2023-01-01 RX ADMIN — HYDROCORTISONE SODIUM SUCCINATE 100 MG: 100 INJECTION, POWDER, FOR SOLUTION INTRAMUSCULAR; INTRAVENOUS at 10:04

## 2023-01-01 RX ADMIN — PERFLUTREN 1.5 ML: 6.52 INJECTION, SUSPENSION INTRAVENOUS at 06:04

## 2023-01-01 RX ADMIN — HEPARIN SODIUM 5000 UNITS: 1000 INJECTION, SOLUTION INTRAVENOUS; SUBCUTANEOUS at 09:04

## 2023-01-01 RX ADMIN — FENTANYL CITRATE 125 MCG: 50 INJECTION, SOLUTION INTRAMUSCULAR; INTRAVENOUS at 08:04

## 2023-01-01 RX ADMIN — EPINEPHRINE 1 MG: 0.1 INJECTION, SOLUTION ENDOTRACHEAL; INTRACARDIAC; INTRAVENOUS at 01:04

## 2023-01-01 RX ADMIN — FENTANYL CITRATE 100 MCG: 50 INJECTION, SOLUTION INTRAMUSCULAR; INTRAVENOUS at 03:04

## 2023-01-01 RX ADMIN — SODIUM BICARBONATE 25 MEQ: 84 INJECTION, SOLUTION INTRAVENOUS at 08:04

## 2023-01-01 RX ADMIN — CALCIUM CHLORIDE 0.5 G: 100 INJECTION INTRAVENOUS; INTRAVENTRICULAR at 09:04

## 2023-01-01 RX ADMIN — MORPHINE SULFATE 2 MG: 2 INJECTION, SOLUTION INTRAMUSCULAR; INTRAVENOUS at 09:04

## 2023-01-01 RX ADMIN — DOBUTAMINE IN DEXTROSE 1.5 MCG/KG/MIN: 400 INJECTION, SOLUTION INTRAVENOUS at 09:04

## 2023-01-01 RX ADMIN — SODIUM BICARBONATE 100 MEQ: 84 INJECTION, SOLUTION INTRAVENOUS at 01:04

## 2023-01-01 RX ADMIN — VASOPRESSIN 4 UNITS: 20 INJECTION, SOLUTION INTRAMUSCULAR; SUBCUTANEOUS at 10:04

## 2023-01-01 RX ADMIN — NOREPINEPHRINE BITARTRATE 3 MCG/KG/MIN: 1 INJECTION, SOLUTION, CONCENTRATE INTRAVENOUS at 12:04

## 2023-01-01 RX ADMIN — PHENYLEPHRINE HYDROCHLORIDE 500 MCG: 10 INJECTION INTRAVENOUS at 08:04

## 2023-01-01 RX ADMIN — ROCURONIUM BROMIDE 50 MG: 100 INJECTION, SOLUTION INTRAVENOUS at 09:04

## 2023-01-01 RX ADMIN — EPHEDRINE SULFATE 25 MG: 50 INJECTION INTRAVENOUS at 08:04

## 2023-01-01 RX ADMIN — VASOPRESSIN 0.04 UNITS/MIN: 20 INJECTION INTRAVENOUS at 11:04

## 2023-01-01 RX ADMIN — SODIUM BICARBONATE 50 MEQ: 84 INJECTION, SOLUTION INTRAVENOUS at 11:04

## 2023-01-01 RX ADMIN — FENTANYL CITRATE 100 MCG: 50 INJECTION INTRAMUSCULAR; INTRAVENOUS at 08:04

## 2023-01-01 RX ADMIN — MORPHINE SULFATE 2 MG: 2 INJECTION, SOLUTION INTRAMUSCULAR; INTRAVENOUS at 10:04

## 2023-01-01 RX ADMIN — PROPOFOL 20 MG: 10 INJECTION, EMULSION INTRAVENOUS at 08:04

## 2023-01-01 RX ADMIN — EPINEPHRINE 1 MG: 0.1 INJECTION, SOLUTION ENDOTRACHEAL; INTRACARDIAC; INTRAVENOUS at 02:04

## 2023-01-01 RX ADMIN — FENTANYL CITRATE 100 MCG: 50 INJECTION, SOLUTION INTRAMUSCULAR; INTRAVENOUS at 05:04

## 2023-01-01 RX ADMIN — SODIUM BICARBONATE 50 MEQ: 84 INJECTION, SOLUTION INTRAVENOUS at 10:04

## 2023-01-01 RX ADMIN — NOREPINEPHRINE BITARTRATE 3 MCG/KG/MIN: 1 INJECTION, SOLUTION, CONCENTRATE INTRAVENOUS at 01:04

## 2023-01-01 RX ADMIN — EPINEPHRINE 0.2 MG: 0.1 INJECTION, SOLUTION ENDOTRACHEAL; INTRACARDIAC; INTRAVENOUS at 11:04

## 2023-01-01 RX ADMIN — SODIUM BICARBONATE 75 MEQ: 84 INJECTION, SOLUTION INTRAVENOUS at 11:04

## 2023-01-01 RX ADMIN — VASOPRESSIN 2 UNITS: 20 INJECTION, SOLUTION INTRAMUSCULAR; SUBCUTANEOUS at 10:04

## 2023-01-01 RX ADMIN — VECURONIUM BROMIDE 10 MG: 1 INJECTION, POWDER, LYOPHILIZED, FOR SOLUTION INTRAVENOUS at 11:04

## 2023-01-01 RX ADMIN — MIDAZOLAM 2 MG: 1 INJECTION INTRAMUSCULAR; INTRAVENOUS at 11:04

## 2023-01-01 RX ADMIN — SODIUM CHLORIDE 1000 ML: 9 INJECTION, SOLUTION INTRAVENOUS at 10:04

## 2023-01-01 RX ADMIN — EPINEPHRINE 0.4 MG: 0.1 INJECTION, SOLUTION ENDOTRACHEAL; INTRACARDIAC; INTRAVENOUS at 11:04

## 2023-01-01 RX ADMIN — SODIUM BICARBONATE 100 MEQ: 84 INJECTION, SOLUTION INTRAVENOUS at 10:04

## 2023-01-01 RX ADMIN — EPINEPHRINE 10 MCG: 1 INJECTION INTRAMUSCULAR; INTRAVENOUS; SUBCUTANEOUS at 10:04

## 2023-01-01 RX ADMIN — CALCIUM CHLORIDE 0.5 G: 100 INJECTION INTRAVENOUS; INTRAVENTRICULAR at 10:04

## 2023-01-01 RX ADMIN — EPINEPHRINE: 0.1 INJECTION, SOLUTION ENDOTRACHEAL; INTRACARDIAC; INTRAVENOUS at 01:04

## 2023-01-01 RX ADMIN — ALBUMIN (HUMAN): 2.5 SOLUTION INTRAVENOUS at 09:04

## 2023-01-01 RX ADMIN — VASOPRESSIN 4 UNITS: 20 INJECTION, SOLUTION INTRAMUSCULAR; SUBCUTANEOUS at 08:04

## 2023-01-01 RX ADMIN — PHENYLEPHRINE HYDROCHLORIDE 60 MCG/MIN: 10 INJECTION INTRAVENOUS at 09:04

## 2023-01-01 RX ADMIN — FENTANYL CITRATE 100 MCG: 50 INJECTION, SOLUTION INTRAMUSCULAR; INTRAVENOUS at 01:04

## 2023-01-01 RX ADMIN — ROCURONIUM BROMIDE 50 MG: 100 INJECTION, SOLUTION INTRAVENOUS at 08:04

## 2023-01-01 RX ADMIN — FENTANYL CITRATE 25 MCG: 50 INJECTION, SOLUTION INTRAMUSCULAR; INTRAVENOUS at 06:04

## 2023-01-01 RX ADMIN — ASPIRIN 325 MG ORAL TABLET 325 MG: 325 PILL ORAL at 09:04

## 2023-01-01 RX ADMIN — EPINEPHRINE 1 MCG/KG/MIN: 1 INJECTION INTRAMUSCULAR; INTRAVENOUS; SUBCUTANEOUS at 12:04

## 2023-01-01 RX ADMIN — DOBUTAMINE HYDROCHLORIDE 20 MCG/KG/MIN: 400 INJECTION INTRAVENOUS at 12:04

## 2023-01-01 RX ADMIN — LIDOCAINE HYDROCHLORIDE 80 MG: 20 INJECTION, SOLUTION INTRAVENOUS at 08:04

## 2023-01-01 RX ADMIN — PHENYLEPHRINE HYDROCHLORIDE 100 MCG: 10 INJECTION INTRAVENOUS at 08:04

## 2023-01-01 RX ADMIN — NOREPINEPHRINE BITARTRATE 0.2 MCG/KG/MIN: 1 INJECTION, SOLUTION, CONCENTRATE INTRAVENOUS at 09:04

## 2023-01-01 RX ADMIN — PHENYLEPHRINE HYDROCHLORIDE 200 MCG: 10 INJECTION INTRAVENOUS at 08:04

## 2023-01-01 RX ADMIN — NITROGLYCERIN 1 INCH: 20 OINTMENT TOPICAL at 09:04

## 2023-01-01 RX ADMIN — ETOMIDATE 15 MG: 2 INJECTION, SOLUTION INTRAVENOUS at 08:04

## 2023-01-01 RX ADMIN — SODIUM BICARBONATE: 84 INJECTION, SOLUTION INTRAVENOUS at 01:04

## 2023-01-01 RX ADMIN — SODIUM BICARBONATE: 84 INJECTION, SOLUTION INTRAVENOUS at 07:04

## 2023-01-01 RX ADMIN — MUPIROCIN: 20 OINTMENT TOPICAL at 08:04

## 2023-01-01 RX ADMIN — CALCIUM CHLORIDE 0.25 G: 100 INJECTION INTRAVENOUS; INTRAVENTRICULAR at 10:04

## 2023-01-01 RX ADMIN — SODIUM BICARBONATE: 84 INJECTION, SOLUTION INTRAVENOUS at 12:04

## 2023-01-01 RX ADMIN — IOPAMIDOL 100 ML: 755 INJECTION, SOLUTION INTRAVENOUS at 11:04

## 2023-01-01 RX ADMIN — VASOPRESSIN 0.02 UNITS/KG/HR: 20 INJECTION, SOLUTION INTRAMUSCULAR; SUBCUTANEOUS at 11:04

## 2023-01-01 RX ADMIN — EPINEPHRINE 2 MCG/KG/MIN: 1 INJECTION INTRAMUSCULAR; INTRAVENOUS; SUBCUTANEOUS at 01:04

## 2023-01-01 RX ADMIN — HEPARIN SODIUM 18 UNITS/KG/HR: 10000 INJECTION, SOLUTION INTRAVENOUS at 01:04

## 2023-02-07 NOTE — PROGRESS NOTES
Subjective:       Patient ID: Drake Barry is a 52 y.o. male.    Chief Complaint: No chief complaint on file.    HPI  Review of Systems      Objective:      Physical Exam    Assessment:       Problem List Items Addressed This Visit    None  Visit Diagnoses       Encounter for physical examination related to employment    -  Primary    Encounter for drug screening                Plan:       See scanned documents in .

## 2023-04-10 PROBLEM — S82.65XA CLOSED NONDISPLACED FRACTURE OF LATERAL MALLEOLUS OF LEFT FIBULA: Status: ACTIVE | Noted: 2023-01-01

## 2023-04-10 PROBLEM — S93.402A MODERATE LEFT ANKLE SPRAIN: Status: ACTIVE | Noted: 2023-01-01

## 2023-04-10 NOTE — PROGRESS NOTES
eSubjective     Patient ID: Drake Barry is a 52 y.o. male.    Chief Complaint: Work Related Injury (Patient presents here today with work related  injury to left ankle that occurred on 4/09/23.  Patient states he slip and fall twisting his left ankle.)    See CC    Review of Systems   Musculoskeletal:  Positive for gait problem, joint swelling and joint deformity.   All other systems reviewed and are negative.       Objective     Physical Exam  Vitals and nursing note reviewed.   Constitutional:       Appearance: Normal appearance.   HENT:      Head: Normocephalic.   Eyes:      Pupils: Pupils are equal, round, and reactive to light.   Cardiovascular:      Rate and Rhythm: Normal rate and regular rhythm.      Heart sounds: Normal heart sounds.   Pulmonary:      Effort: Pulmonary effort is normal.      Breath sounds: Normal breath sounds.   Musculoskeletal:         General: Swelling, tenderness, deformity and signs of injury present. Normal range of motion.      Cervical back: Normal range of motion.      Left lower leg: Edema present.   Skin:     General: Skin is warm and dry.   Neurological:      General: No focal deficit present.      Mental Status: He is alert and oriented to person, place, and time.   Psychiatric:         Attention and Perception: Attention and perception normal.         Mood and Affect: Mood normal.         Speech: Speech normal.         Behavior: Behavior normal. Behavior is cooperative.         Cognition and Memory: Cognition normal.         Judgment: Judgment normal.     Imaging: X-Ray Ankle Complete 3 View Left    Result Date: 4/10/2023  EXAMINATION: XR ANKLE COMPLETE 3 VIEW LEFT CLINICAL HISTORY: Sprain of unspecified ligament of left ankle, initial encounter TECHNIQUE: AP, lateral and oblique views of the left ankle were performed. COMPARISON: None FINDINGS: There is widening of the medial tibiotalar joint space.  There is an acute minimally displaced fracture of the lateral malleolus  with extensive soft tissue swelling.     As above. Electronically signed by: Hasmukh Landrum Date:    04/10/2023 Time:    09:44           Assessment and Plan     Problem List Items Addressed This Visit    None  Visit Diagnoses       Moderate left ankle sprain, initial encounter    -  Primary    Relevant Orders    X-Ray Ankle Complete 3 View Left            RTW with no weight bearing. Ice, elevate and Mobic for pain relief. Use boot when up or ambulating. No weight bearing. Use crutches. Appt with Dr. Chino 4/13/23 at 3:20.

## 2023-04-10 NOTE — PROGRESS NOTES
Subjective     Patient ID: Drake Barry is a 52 y.o. male.    Chief Complaint: No chief complaint on file.    HPI  Review of Systems       Objective     Physical Exam       Assessment and Plan     Problem List Items Addressed This Visit    None  Visit Diagnoses       Encounter for drug screening    -  Primary            Drug testing only

## 2023-04-12 PROBLEM — S82.892A CLOSED FRACTURE OF LEFT ANKLE: Status: ACTIVE | Noted: 2023-01-01

## 2023-04-12 NOTE — LETTER
April 12, 2023      [facility]  [address] [place] [id number]  Phone: [phone]  Fax: [phone]       Patient: Drake Barry   YOB: 1970  Date of Visit: 04/12/2023    To Whom It May Concern:    Valeriano Barry  was at [facility] on 04/12/2023. The patient may return to work/school on 4/13/23 with restrictions. Seated duties only. Must wear cam walker boot and use crutches.  If you have any questions or concerns, or if I can be of further assistance, please do not hesitate to contact me.    Sincerely,  Dr. Chino/  Edie Wong RN

## 2023-04-12 NOTE — PROGRESS NOTES
CLINIC NOTE       Chief Complaint   Patient presents with    Left Ankle - Pain, Injury        Drake Barry is a 52 y.o. male seen today for evaluation of his left ankle.  He was reportedly injured while at work for [employer] on 04/09/2023.  He slipped sustaining a twisting injury to his ankle.  He was seen at [facility] by Zaira Tellez RN.  X-rays there revealed nondisplaced spiral oblique fracture of the lateral malleolus.  He was placed in a cam walker boot, given crutches and she is ambulating nonweightbearing on left lower extremity.  He is not on any blood thinners.  X-rays left ankle today 04/12/2023 AP lateral mortise views of the bones well mineralized.  Ankle mortise normally aligned.  There is a nondisplaced spiral oblique fracture lateral malleolus present.  History reviewed. No pertinent past medical history.  History reviewed. No pertinent family history.  Current Outpatient Medications on File Prior to Visit   Medication Sig Dispense Refill    meloxicam (MOBIC) 7.5 MG tablet Take 1 tablet (7.5 mg total) by mouth once daily. 10 tablet 0    predniSONE (DELTASONE) 20 MG tablet Two every morning 10 tablet 0    tiZANidine (ZANAFLEX) 4 MG tablet 1 or 2 @hs for muscle spasm 30 tablet 2    traMADoL (ULTRAM) 50 mg tablet Take 1 tablet (50 mg total) by mouth every 6 (six) hours. 15 tablet 0     No current facility-administered medications on file prior to visit.       ROS     There were no vitals filed for this visit.    History reviewed. No pertinent surgical history.     Review of patient's allergies indicates:  No Known Allergies     Ortho Exam well-developed well-nourished black male no acute distress.  Alert oriented cooperative.  Neck is supple without JVD.  Breathing is regular nonlabored.  Skin is warm dry no lesions seen.  There is moderate soft tissue swelling and tenderness over the lateral malleolar region of the ankle.  Skin is intact.      Assessment and Plan  Patient Active  Problem List    Diagnosis Date Noted    Closed nondisplaced fracture of lateral malleolus of left fibula 04/10/2023    Moderate left ankle sprain 04/10/2023    Impression:  Closed nondisplaced Lateral malleolar fracture-left ankle   Plan:  1. ASA 81 mg 1 p.o. q.d. for DVT prophylaxis.  2. Continue cam walker boot and crutches toe-touch weight-bearing left lower extremity.  3. Recheck 2 weeks repeat x-ray or sooner for interval problems.  Anticipate beginning weight-bearing at week 4.  He was given a note for sedentary light duties/seated work if available.                                  Radiology Interpretation        Patient Name: Drake Barry  Date: 4/12/2023  YOB: 1970  MRN# [id number]        ORDERING DIAGNOSIS:  No diagnosis found.          X-rays left ankle today 04/12/2023 AP lateral mortise views of the bones well mineralized.  Ankle mortise normally aligned.  There is a nondisplaced spiral oblique fracture lateral malleolus present.             MD Hasmukh Aceves M.D.

## 2023-04-22 PROBLEM — I26.09 ACUTE PULMONARY EMBOLISM WITH ACUTE COR PULMONALE: Status: ACTIVE | Noted: 2023-01-01

## 2023-04-22 PROBLEM — Z01.810 PREOPERATIVE CARDIOVASCULAR EXAMINATION: Status: ACTIVE | Noted: 2023-01-01

## 2023-04-22 PROBLEM — N17.9 AKI (ACUTE KIDNEY INJURY): Status: ACTIVE | Noted: 2023-01-01

## 2023-04-22 PROBLEM — R73.9 HYPERGLYCEMIA, UNSPECIFIED: Status: ACTIVE | Noted: 2023-01-01

## 2023-04-22 PROBLEM — I26.99 BILATERAL PULMONARY EMBOLISM: Status: ACTIVE | Noted: 2023-01-01

## 2023-04-22 PROBLEM — S93.402A MODERATE LEFT ANKLE SPRAIN: Status: RESOLVED | Noted: 2023-01-01 | Resolved: 2023-01-01

## 2023-04-22 PROBLEM — S82.892A CLOSED FRACTURE OF LEFT ANKLE: Status: RESOLVED | Noted: 2023-01-01 | Resolved: 2023-01-01

## 2023-04-22 NOTE — HPI
52-year-old male patient who had ankle fracture two weeks ago.  He presented last night and was admitted after presenting with shortness breath and severe abdominal pain.  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  There was no bowel wall thickening.  Patient has worsening lactic acidosis.  Surgery is consulted to evaluate the ischemic bowel component of his illness.

## 2023-04-22 NOTE — ANESTHESIA PREPROCEDURE EVALUATION
04/22/2023  Drake Barry is a 52 y.o., male.      Pre-op Assessment    I have reviewed the Patient Summary Reports.     I have reviewed the Nursing Notes. I have reviewed the NPO Status.   I have reviewed the Medications.     Review of Systems  Anesthesia Hx:  No problems with previous Anesthesia  Denies Family Hx of Anesthesia complications.   Denies Personal Hx of Anesthesia complications.   Social:  Non-Smoker, No Alcohol Use    Cardiovascular:   ECG has been reviewed. 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    Lactate > 15   Endocrine:  Obesity / BMI > 30      Physical Exam  General: Alert and Anxious  In obvious discomfort  Airway:  Mallampati: II   Mouth Opening: Normal  TM Distance: Normal  Tongue: Normal  Neck ROM: Normal ROM    Dental:  Intact    Chest/Lungs:  Tachypnea    Heart:  Rate: Normal, Tachycardia  Rhythm: Regular Rhythm        Chemistry        Component Value Date/Time     04/22/2023 0305    K 3.8 04/22/2023 0305     04/22/2023 0305    CO2 15 (L) 04/22/2023 0305    BUN 19 (H) 04/22/2023 0305    CREATININE 1.75 (H) 04/22/2023 0305     04/22/2023 0305        Component Value Date/Time    CALCIUM 9.9 04/22/2023 0305    ALKPHOS 133 (H) 04/21/2023 2106    AST 56 (H) 04/21/2023 2106    ALT 47 04/21/2023 2106    BILITOT 0.7 04/21/2023 2106        Lab Results    Component Value Date    WBC 15.24 (H) 04/22/2023    HGB 14.4 04/22/2023    HCT 49.6 04/22/2023     04/22/2023     Results for orders placed or performed during the hospital encounter of 04/21/23   EKG 12-lead    Collection Time: 04/21/23  8:49 PM    Narrative    Test Reason : R07.9,    Vent. Rate : 087 BPM     Atrial Rate : 000 BPM     P-R Int : 168 ms          QRS Dur : 080 ms      QT Int : 358 ms       P-R-T Axes : 063 072 -53 degrees     QTc Int : 405 ms     ACUTE MI / ISCHEMIA   Sinus rhythm  Widespread ST-T abnormality suggests myocardial injury/ischemia  Abnormal ECG      Referred By: [email or web address]   SELF           Confirmed By:          Anesthesia Plan  Type of Anesthesia, risks & benefits discussed:    Anesthesia Type: Gen ETT  Intra-op Monitoring Plan: Standard ASA Monitors  Post Op Pain Control Plan: multimodal analgesia  Induction:  IV  Airway Plan: Direct, Post-Induction  Informed Consent: Informed consent signed with the Patient and all parties understand the risks and agree with anesthesia plan.  All questions answered.   ASA Score: 4 Emergent  Day of Surgery Review of History & Physical: H&P Update referred to the surgeon/provider.I have interviewed and examined the patient. I have reviewed the patient's H&P dated: There are no significant changes.   Anesthesia Plan Notes: ASA 4E, GETA christopher and TLC planned, will have pRBCs available    Ready For Surgery From Anesthesia Perspective.     .

## 2023-04-22 NOTE — CONSULTS
[facility]  Cardiology  Consult Note    Patient Name: Drake Barry  MRN: [id number]  Admission Date: 4/21/2023  Hospital Length of Stay: 0 days  Code Status: Full Code   Attending Provider: Bradford Sexton DO   Consulting Provider: Vicente Ruiz MD  Primary Care Physician: Donovan Broderick III, MD  Principal Problem:Embolic infarction    Patient information was obtained from patient and ER records.     Inpatient consult to Cardiology  Consult performed by: Vicente Ruiz MD  Consult ordered by: Archie Izquierdo MD        Subjective:     Chief Complaint:  Abdominal pain     HPI:   The patient is a 52 year old male with no significant PMHx other than a recent left leg fracture that presents to [facility] with abdominal pain and SOB.  The patient stated that earlier in the day an abrupt onset of abdominal pain prompted him to seek medical attention. The patient points to his lower abdomen when ask where the pain is, especially RLQ. He states that has never happened before previously and that he is generally a healthy individual. The patient and his wife deny any knowledge of a family history of coagulation disorders.     Patient had a left lateral malleolus spiral fracture on  4/10/23. He has been largely non-ambulatory and report left leg swelling and pain.      In the ED the patient received morphine 2 mg IV x2, NaCl 0.9% 1L bolus, Aspirin 325 mg PO and Nitro paste. EKG - NSR.  - CT Abdomen preliminary - Lung bases show PE. Bilateral renal wedge-shaped areas of decreased perfusion are consistent with pyelonephritis, or areas of ischemic change from emboli. The aorta appears to have a filling defect extending into the SMA suspicious for thrombus.  - CTA Chest - Bilateral pulmonary emboli. Limited visualization of the upper abdomen demonstrates localized thrombus within the visualized celiac trunk and adjacent anterior abdominal aorta.      Cardiology consulted for troponin elevation  and pulmonary embolus and pre-op evaluation.  Patient has worsening lactate with concern for bowel ischemia in the setting of SMA thrombus.       Past Medical History:   Diagnosis Date    Closed fracture of left ankle 04/12/2023    Obesity (BMI 35.0-39.9 without comorbidity)        Past Surgical History:   Procedure Laterality Date    TONSILLECTOMY         Review of patient's allergies indicates:  No Known Allergies    No current facility-administered medications on file prior to encounter.     Current Outpatient Medications on File Prior to Encounter   Medication Sig    [DISCONTINUED] meloxicam (MOBIC) 7.5 MG tablet Take 1 tablet (7.5 mg total) by mouth once daily.    [DISCONTINUED] predniSONE (DELTASONE) 20 MG tablet Two every morning    [DISCONTINUED] tiZANidine (ZANAFLEX) 4 MG tablet 1 or 2 @hs for muscle spasm    [DISCONTINUED] traMADoL (ULTRAM) 50 mg tablet Take 1 tablet (50 mg total) by mouth every 6 (six) hours.     Family History    None       Tobacco Use    Smoking status: Never    Smokeless tobacco: Never   Substance and Sexual Activity    Alcohol use: Not on file    Drug use: Not on file    Sexual activity: Not on file     Review of Systems   Constitutional: Positive for malaise/fatigue and night sweats.   Cardiovascular:  Positive for chest pain, dyspnea on exertion and leg swelling. Negative for orthopnea, palpitations and paroxysmal nocturnal dyspnea.   Respiratory:  Positive for shortness of breath.    Gastrointestinal:  Positive for abdominal pain.   All other systems reviewed and are negative.  Objective:     Vital Signs (Most Recent):  Temp: 97.4 °F (36.3 °C) (04/22/23 1147)  Pulse: (!) 130 (04/22/23 1147)  Resp: 12 (04/22/23 1147)  BP: (!) 96/51 (04/22/23 1147)  SpO2: (!) 94 % (04/22/23 1147)   Vital Signs (24h Range):  Temp:  [95.7 °F (35.4 °C)-97.9 °F (36.6 °C)] 97.4 °F (36.3 °C)  Pulse:  [] 130  Resp:  [12-42] 12  SpO2:  [88 %-100 %] 94 %  BP: ()/() 96/51      Weight: 106.6 kg (235 lb)  Body mass index is 35.73 kg/m².    SpO2: (!) 94 %         Intake/Output Summary (Last 24 hours) at 4/22/2023 1150  Last data filed at 4/22/2023 1116  Gross per 24 hour   Intake 1250 ml   Output --   Net 1250 ml       Lines/Drains/Airways       Drain  Duration                  Urethral Catheter 04/22/23 Non-latex;Straight-tip 16 Fr. <1 day              Airway  Duration                  Airway - Non-Surgical 04/22/23 0850 <1 day              Arterial Line  Duration             Arterial Line 04/22/23 0850 Right Radial <1 day              Peripheral Intravenous Line  Duration                  Peripheral IV - Single Lumen 04/21/23 2057 20 G Left Antecubital <1 day         Peripheral IV - Single Lumen 04/22/23 0100 20 G Anterior;Left Forearm <1 day                    Physical Exam  Vitals and nursing note reviewed.   Constitutional:       General: He is in acute distress.      Appearance: He is toxic-appearing. He is not diaphoretic.   HENT:      Head: Normocephalic and atraumatic.      Right Ear: External ear normal.      Left Ear: External ear normal.      Nose: Nose normal.      Mouth/Throat:      Mouth: Mucous membranes are dry.      Pharynx: Oropharynx is clear.   Eyes:      Extraocular Movements: Extraocular movements intact.      Conjunctiva/sclera: Conjunctivae normal.   Cardiovascular:      Rate and Rhythm: Regular rhythm. Tachycardia present.      Pulses: Normal pulses.      Heart sounds: Normal heart sounds.   Pulmonary:      Effort: Pulmonary effort is normal.      Breath sounds: Normal breath sounds.   Abdominal:      General: Abdomen is flat.      Tenderness: There is abdominal tenderness. There is guarding.   Musculoskeletal:         General: Normal range of motion.      Cervical back: Normal range of motion and neck supple.      Right lower leg: No edema.      Left lower leg: Edema present.   Skin:     General: Skin is warm.      Capillary Refill: Capillary refill takes less  than 2 seconds.   Neurological:      General: No focal deficit present.      Mental Status: He is alert. Mental status is at baseline.   Psychiatric:         Mood and Affect: Mood normal.         Behavior: Behavior normal.       Significant Labs: BMP:   Recent Labs   Lab 04/21/23 2106 04/22/23  0305   * 105    144   K 3.4* 3.8    102   CO2 20* 15*   BUN 22* 19*   CREATININE 1.68* 1.75*   CALCIUM 9.6 9.9   MG  --  2.5*   , CMP   Recent Labs   Lab 04/21/23 2106 04/22/23  0305    144   K 3.4* 3.8    102   CO2 20* 15*   * 105   BUN 22* 19*   CREATININE 1.68* 1.75*   CALCIUM 9.6 9.9   PROT 9.0*  --    ALBUMIN 3.9  --    BILITOT 0.7  --    ALKPHOS 133*  --    AST 56*  --    ALT 47  --    ANIONGAP 21* 31*   , CBC   Recent Labs   Lab 04/21/23 2106 04/22/23 0305 04/22/23  0937 04/22/23  1049   WBC 8.31 15.24*  --   --    HGB 13.6 14.4  --   --    HCT 44.3 49.6   < > 25*    158  --   --     < > = values in this interval not displayed.   , INR   Recent Labs   Lab 04/21/23 2109   INR 1.18   , Lipid Panel No results for input(s): CHOL, HDL, LDLCALC, TRIG, CHOLHDL in the last 48 hours., Troponin No results for input(s): TROPONINI in the last 48 hours., and All pertinent lab results from the last 24 hours have been reviewed.    Significant Imaging: CT scan: ABP  Thrombus/embolus within the lumen of the common origin of the celiac and superior mesenteric arteries with extension into both the celiac and SMA.  There is also thrombus extending into the lumen of the abdominal aorta.     Presumed small bilateral renal infarcts bilaterally    CTPE   Bilateral pulmonary thromboembolism present more extensive on the right extending into to both upper and lower branches bilateral.     Thrombus is seen in the abdominal aorta extending into the superior mesenteric artery partially visualized.  No other thrombus or other abnormality is identified in the pulmonary arteries or veins.  The  pulmonary vessel caliber is within normal limits.  The heart, mediastinum and great vessels appear within normal limits.     Lung parenchyma shows no evidence of airspace disease or abnormal density.  No effusion or pneumothorax is present.      , Echocardiogram: Transthoracic echo (TTE) complete (Cupid Only):   Results for orders placed or performed during the hospital encounter of 04/21/23   Echo   Result Value Ref Range    BSA 2.26 m2    AORTIC VALVE CUSP SEPERATION 2.00 cm    LVIDd 3.64 3.5 - 6.0 cm    IVS 1.16 (A) 0.6 - 1.1 cm    Posterior Wall 1.11 (A) 0.6 - 1.1 cm    LVIDs 2.56 2.1 - 4.0 cm    FS 30 28 - 44 %    LV mass 132.22 g    LA size 3.10 cm    RVDD 2.65 cm    RA area 10.9 cm2    Left Ventricle Relative Wall Thickness 0.61 cm    AV mean gradient 39 mmHg    E wave deceleration time 158.00 msec    LVOT diameter 2.10 cm    LVOT area 3.5 cm2    Ao peak milan 1.6 m/s    Ao VTI 67.44 cm    AV peak gradient 10 mmHg    MV Peak E [place] 0.66 m/s    TR Max [place] 4.06 m/s    LV Systolic Volume 23.68 mL    LV Systolic Volume Index 10.8 mL/m2    LV Diastolic Volume 55.89 mL    LV Diastolic Volume Index 25.52 mL/m2    LV Mass Index 60 g/m2    Triscuspid Valve Regurgitation Peak Gradient 66 mmHg    EF 60 %    Narrative    · The left ventricle is normal in size with normal systolic function.  · The estimated ejection fraction is 60%.  · Moderate right ventricular enlargement with moderately reduced right   ventricular systolic function.  · Moderate right atrial enlargement.  · Mild tricuspid regurgitation.  · Mild-to-moderate mitral regurgitation.  · There is pulmonary hypertension.  · ECHO consistent with RV strain in the pulmonary embolus      , and EKG:   Results for orders placed or performed during the hospital encounter of 04/21/23   EKG 12-lead    Collection Time: 04/21/23  8:49 PM    Narrative    Test Reason : R07.9,    Vent. Rate : 087 BPM     Atrial Rate : 000 BPM     P-R Int : 168 ms          QRS Dur : 080 ms       QT Int : 358 ms       P-R-T Axes : 063 072 -53 degrees     QTc Int : 405 ms     ACUTE MI / ISCHEMIA   Sinus rhythm  Widespread ST-T abnormality suggests myocardial injury/ischemia  Abnormal ECG      Referred By: [email or web address]   SELF           Confirmed By:           Assessment and Plan:     * Embolic infarction  Concern for paradoxical emboli (PE as well as renal infarct and SMA thrombus)  Will need coagulation workup  Will need CAROLYN for PFO/ASD evaluation for possible parodoxical emboli  Will need US DVT left leg given recent fracture    Preoperative cardiovascular examination  Patient for emergent laproscopy for bowel ischemia in the setting of SMA thrombus  Lactate worsening  Patient is high risk for morbidity and mortality, but not prohibitive for emergent laproscopy+/- laprotomy for bowel ischemia  Will need close hemodynamic monitoring give RV strain with likely underlying RV failure  May need inotrope support    Acute pulmonary embolism with acute cor pulmonale  CTPE with large bilateral PE (R>L)  US dvt pending  RV strain on ECHO and CTPE; severely elevated troponin and ntproBNP  Patient is high risk PE; unable to give lytic therapy given need for surgical exploration for bowel ischemia  Will consider PE thrombectomy if patient remains hemodynamically stable post op.    Lactic acidosis  Bowel ischemia vs. Obstructive shock (2/2 to PE)  Agree with central line monitoring.           VTE Risk Mitigation (From admission, onward)         Ordered     IP VTE HIGH RISK PATIENT  Once         04/22/23 0210     Place sequential compression device  Until discontinued         04/22/23 0210     heparin 25,000 units in dextrose 5% 250 mL (100 units/mL) infusion HIGH INTENSITY nomogram - [facility]  Continuous        Question:  Begin at (in units/kg/hr)  Answer:  18    04/22/23 0020     heparin 25,000 units in dextrose 5% (100 units/ml) IV bolus from bag - ADDITIONAL PRN BOLUS - 60 units/kg  As needed (PRN)        Question:  Heparin  Infusion Adjustment (DO NOT MODIFY ANSWER)  Answer:  [email or web address]\epic\Images\Pharmacy\HeparinInfusions\heparin HIGH INTENSITY nomogram for [place] [id number]    04/22/23 0020     heparin 25,000 units in dextrose 5% (100 units/ml) IV bolus from bag - ADDITIONAL PRN BOLUS - 30 units/kg  As needed (PRN)        Question:  Heparin Infusion Adjustment (DO NOT MODIFY ANSWER)  Answer:  [email or web address]\epic\Images\Pharmacy\HeparinInfusions\heparin HIGH INTENSITY nomogram for [place] [id number]    04/22/23 0020                Thank you for your consult. I will follow-up with patient. Please contact us if you have any additional questions.    Vicente Ruiz MD  Cardiology   [facility]

## 2023-04-22 NOTE — ANESTHESIA PROCEDURE NOTES
Arterial    Diagnosis: bowel ischemia    Patient location during procedure: done in OR  Procedure start time: 4/22/2023 8:50 AM  Timeout: 4/22/2023 8:40 AM  Procedure end time: 4/22/2023 8:55 AM    Staffing  Authorizing Provider: José Alex DO  Performing Provider: Mitchell Blackwell CRNA    Anesthesiologist was present at the time of the procedure.  Arterial

## 2023-04-22 NOTE — NURSING
Post mortem care and bath complete. Lines removed.  and [place] notified. Family members undecided on  home.

## 2023-04-22 NOTE — SUBJECTIVE & OBJECTIVE
Past Medical History:   Diagnosis Date    Closed fracture of left ankle 04/12/2023    Obesity (BMI 35.0-39.9 without comorbidity)        Past Surgical History:   Procedure Laterality Date    TONSILLECTOMY         Review of patient's allergies indicates:  No Known Allergies    No current facility-administered medications on file prior to encounter.     Current Outpatient Medications on File Prior to Encounter   Medication Sig    meloxicam (MOBIC) 7.5 MG tablet Take 1 tablet (7.5 mg total) by mouth once daily.    predniSONE (DELTASONE) 20 MG tablet Two every morning    tiZANidine (ZANAFLEX) 4 MG tablet 1 or 2 @hs for muscle spasm    traMADoL (ULTRAM) 50 mg tablet Take 1 tablet (50 mg total) by mouth every 6 (six) hours.     Family History    None       Tobacco Use    Smoking status: Never    Smokeless tobacco: Never   Substance and Sexual Activity    Alcohol use: Not on file    Drug use: Not on file    Sexual activity: Not on file     Review of Systems   Constitutional:  Negative for activity change and appetite change.   HENT:  Positive for dental problem. Negative for facial swelling, nosebleeds and rhinorrhea.    Eyes:  Negative for pain and itching.   Respiratory:  Positive for shortness of breath. Negative for cough, choking, wheezing and stridor.    Cardiovascular:  Positive for palpitations and leg swelling. Negative for chest pain.   Gastrointestinal:  Positive for abdominal pain and diarrhea. Negative for abdominal distention and constipation.   Endocrine: Negative for cold intolerance and heat intolerance.   Genitourinary:  Negative for difficulty urinating, dysuria and flank pain.   Musculoskeletal:  Positive for arthralgias, gait problem and joint swelling.   Skin:  Negative for pallor and rash.   Allergic/Immunologic: Negative for environmental allergies and food allergies.   Neurological:  Negative for dizziness, syncope, speech difficulty, numbness and headaches.   Psychiatric/Behavioral:  Negative for  agitation, decreased concentration and hallucinations. The patient is not hyperactive.    All other systems reviewed and are negative.  Objective:     Vital Signs (Most Recent):  Temp: 97.6 °F (36.4 °C) (04/22/23 0040)  Pulse: 86 (04/22/23 0010)  Resp: 20 (04/22/23 0111)  BP: (!) 170/89 (04/22/23 0010)  SpO2: 96 % (04/22/23 0010)   Vital Signs (24h Range):  Temp:  [95.7 °F (35.4 °C)-97.6 °F (36.4 °C)] 97.6 °F (36.4 °C)  Pulse:  [81-89] 86  Resp:  [20-26] 20  SpO2:  [88 %-98 %] 96 %  BP: (155-205)/() 170/89     Weight: 106.7 kg (235 lb 3.7 oz)  Body mass index is 35.77 kg/m².    Physical Exam  Vitals reviewed.   Constitutional:       General: He is awake. He is not in acute distress.     Appearance: Normal appearance. He is well-developed and well-groomed. He is morbidly obese. He is ill-appearing.      Interventions: He is sedated.   HENT:      Head: Normocephalic and atraumatic.      Mouth/Throat:      Mouth: Mucous membranes are moist.      Pharynx: Oropharynx is clear.   Eyes:      Conjunctiva/sclera: Conjunctivae normal.      Comments: Contacts bilateral    Cardiovascular:      Rate and Rhythm: Normal rate and regular rhythm.      Pulses: Normal pulses.      Heart sounds: Normal heart sounds.   Pulmonary:      Effort: Pulmonary effort is normal.      Breath sounds: Normal breath sounds.   Abdominal:      Palpations: Abdomen is soft.      Tenderness: There is abdominal tenderness in the right lower quadrant, suprapubic area and left lower quadrant. There is no guarding.   Musculoskeletal:      Left lower leg: Tenderness present.   Skin:     General: Skin is warm and dry.   Neurological:      Mental Status: He is alert and oriented to person, place, and time.   Psychiatric:         Mood and Affect: Mood normal.         Behavior: Behavior normal. Behavior is cooperative.           Significant Labs: All pertinent labs within the past 24 hours have been reviewed.    Significant Imaging: I have reviewed all  pertinent imaging results/findings within the past 24 hours.

## 2023-04-22 NOTE — TRANSFER OF CARE
"Anesthesia Transfer of Care Note    Patient: Drake Barry    Procedure(s) Performed: Procedure(s) (LRB):  LAPAROTOMY, EXPLORATORY (N/A)    Patient location: PACU    Anesthesia Type: general    Transport from OR: Transported from OR on room air with adequate spontaneous ventilation    Post pain: adequate analgesia    Post assessment: no apparent anesthetic complications and tolerated procedure well    Post vital signs: stable    Level of consciousness: responds to stimulation    Nausea/Vomiting: no nausea/vomiting    Complications: none    Transfer of care protocol was followedComments: Report Given to PACU rn VSS      Last vitals:   Visit Vitals  BP (!) 96/51 (BP Location: Right arm, Patient Position: Lying)   Pulse (!) 130   Temp 36.3 °C (97.4 °F) (Oral)   Resp 12   Ht 5' 8" (1.727 m)   Wt 106.6 kg (235 lb)   SpO2 (!) 94%   BMI 35.73 kg/m²     "

## 2023-04-22 NOTE — ASSESSMENT & PLAN NOTE
Patient for emergent laproscopy for bowel ischemia in the setting of SMA thrombus  Lactate worsening  Patient is high risk for morbidity and mortality, but not prohibitive for emergent laproscopy+/- laprotomy for bowel ischemia  Will need close hemodynamic monitoring give RV strain with likely underlying RV failure  May need inotrope support

## 2023-04-22 NOTE — CONSULTS
[facility]  General Surgery  Consult Note    Patient Name: Drake Barry  MRN: [id number]  Code Status: Full Code  Admission Date: 4/21/2023  Hospital Length of Stay: 0 days  Attending Physician: Bradford Sexton DO  Primary Care Provider: Donovan Broderick III, MD    Patient information was obtained from patient, spouse/SO, ER records and primary team.     Consults  Subjective:     Principal Problem: Embolic infarction    History of Present Illness: 52-year-old male patient who had ankle fracture two weeks ago.  He presented last night and was admitted after presenting with shortness breath and severe abdominal pain.  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  There was no bowel wall thickening.  Patient has worsening lactic acidosis.  Surgery is consulted to evaluate the ischemic bowel component of his illness.       No current facility-administered medications on file prior to encounter.     Current Outpatient Medications on File Prior to Encounter   Medication Sig    meloxicam (MOBIC) 7.5 MG tablet Take 1 tablet (7.5 mg total) by mouth once daily.    predniSONE (DELTASONE) 20 MG tablet Two every morning    tiZANidine (ZANAFLEX) 4 MG tablet 1 or 2 @hs for muscle spasm    traMADoL (ULTRAM) 50 mg tablet Take 1 tablet (50 mg total) by mouth every 6 (six) hours.       Review of patient's allergies indicates:  No Known Allergies    Past Medical History:   Diagnosis Date    Closed fracture of left ankle 04/12/2023    Obesity (BMI 35.0-39.9 without comorbidity)      Past Surgical History:   Procedure Laterality Date    TONSILLECTOMY       Family History    None       Tobacco Use    Smoking status: Never    Smokeless tobacco: Never   Substance and Sexual Activity    Alcohol use: Not on file    Drug use: Not on file    Sexual activity: Not on file     Review of Systems   All other systems reviewed and are negative.  Objective:     Vital Signs (Most  Recent):  Temp: 97.9 °F (36.6 °C) (04/22/23 0245)  Pulse: 97 (04/22/23 0345)  Resp: 20 (04/22/23 0604)  BP: (!) 182/100 (04/22/23 0345)  SpO2: 96 % (04/22/23 0500)   Vital Signs (24h Range):  Temp:  [95.7 °F (35.4 °C)-97.9 °F (36.6 °C)] 97.9 °F (36.6 °C)  Pulse:  [81-97] 97  Resp:  [20-42] 20  SpO2:  [88 %-100 %] 96 %  BP: (151-208)/() 182/100     Weight: 106.7 kg (235 lb 3.7 oz)  Body mass index is 35.77 kg/m².    Physical Exam  Cardiovascular:      Rate and Rhythm: Normal rate.      Pulses: Normal pulses.   Pulmonary:      Effort: Respiratory distress present.   Abdominal:      Palpations: Abdomen is soft.      Tenderness: There is abdominal tenderness (diffuse).   Musculoskeletal:         General: No swelling.   Skin:     Coloration: Skin is not jaundiced.   Neurological:      General: No focal deficit present.      Mental Status: He is alert.   Psychiatric:         Mood and Affect: Mood normal.       Significant Labs:  I have reviewed all pertinent lab results within the past 24 hours.  CBC:   Recent Labs   Lab 04/22/23  0305   WBC 15.24*   RBC 5.23   HGB 14.4   HCT 49.6      MCV 94.8   MCH 27.5   MCHC 29.0*     BMP:   Recent Labs   Lab 04/22/23  0305         K 3.8      CO2 15*   BUN 19*   CREATININE 1.75*   CALCIUM 9.9   MG 2.5*         Significant Diagnostics:  I have reviewed all pertinent imaging results/findings within the past 24 hours.  Per HPI      Assessment/Plan:     * Embolic infarction  Concern for small bowel ischemia, due to ongoing acidosis  Take to OR for evaluation of intestines, likely thrombectomy of SMA  Discussed recommended plan with the patient and his was to include initial as laparoscopy to evaluate bowel, likely conversion to laparotomy for bowel resection, thrombectomy of the mesenteric vessels.  Risks and benefits include hernia, possible need for further surgery ,likely to remain intubated postoperatively, bleeding, infection and heart or lung  complications from anesthesia.  Patient and wife understand, wish to proceed.     Bilateral pulmonary embolism  Spoke with cardiology regarding the plan for surgery.  There is some right heart strain from the PE.  Recommends invasive monitoring intraop and postop (CVP, [place])    Lactic acidosis  Plan laparoscopy to evaluate bowels, with further surgery as indicated by findings.  See section above on lactic acidosis.      VTE Risk Mitigation (From admission, onward)         Ordered     IP VTE HIGH RISK PATIENT  Once         04/22/23 0210     Place sequential compression device  Until discontinued         04/22/23 0210     heparin 25,000 units in dextrose 5% 250 mL (100 units/mL) infusion HIGH INTENSITY nomogram - [facility]  Continuous        Question:  Begin at (in units/kg/hr)  Answer:  18    04/22/23 0020     heparin 25,000 units in dextrose 5% (100 units/ml) IV bolus from bag - ADDITIONAL PRN BOLUS - 60 units/kg  As needed (PRN)        Question:  Heparin Infusion Adjustment (DO NOT MODIFY ANSWER)  Answer:  \\[email or web address]\epic\Images\Pharmacy\HeparinInfusions\heparin HIGH INTENSITY nomogram for [place] [id number]    04/22/23 0020     heparin 25,000 units in dextrose 5% (100 units/ml) IV bolus from bag - ADDITIONAL PRN BOLUS - 30 units/kg  As needed (PRN)        Question:  Heparin Infusion Adjustment (DO NOT MODIFY ANSWER)  Answer:  \\[email or web address]\epic\Images\Pharmacy\HeparinInfusions\heparin HIGH INTENSITY nomogram for [place] [id number]    04/22/23 0020                Thank you for your consult. I will follow-up with patient. Please contact us if you have any additional questions.    Edmond Clark MD  General Surgery  [facility]

## 2023-04-22 NOTE — H&P
[facility] Hospital Medicine  History & Physical    Patient Name: Drake Barry  MRN: [id number]  Patient Class: IP- Inpatient  Admission Date: 4/21/2023  Attending Physician: Bradford Sexton DO   Primary Care Provider: Donovan Broderick III, MD         Patient information was obtained from patient, spouse/SO, past medical records and ER records.     Subjective:     Principal Problem:Embolic infarction    Chief Complaint:   Chief Complaint   Patient presents with    Abdominal Pain    Shortness of Breath        HPI: The patient is a 52 year old male that presents to [facility] with abdominal pain and SOB. He has no significant PMHx and states that he takes no medication at home. The history taking is limited due to patient recently receiving fentanyl 100 mcg IV prior to exam. The patient stated that earlier in the day an abrupt onset of abdominal pain prompted him to seek medical attention. The patient points to his lower abdomen when ask where the pain is, especially RLQ. He states that has never happened before previously and that he is generally a healthy individual. The patient and his wife deny any knowledge of a family history of coagulation disorders.     In the ED the patient received morphine 2 mg IV x2, NaCl 0.9% 1L bolus, Aspirin 325 mg PO and Nitro paste. EKG - NSR.  - CT Abdomen preliminary - Lung bases show PE. Bilateral renal wedge-shaped areas of decreased perfusion are consistent with pyelonephritis, or areas of ischemic change from emboli. The aorta appears to have a filling defect extending into the SMA suspicious for thrombus.  - CTA Chest - Bilateral pulmonary emboli. Limited visualization of the upper abdomen demonstrates localized thrombus within the visualized celiac trunk and adjacent anterior abdominal aorta.     The patient will be admitted to the ICU at [facility] for continued care and medical management.       Past Medical History:   Diagnosis Date    Closed  fracture of left ankle 04/12/2023    Obesity (BMI 35.0-39.9 without comorbidity)        Past Surgical History:   Procedure Laterality Date    TONSILLECTOMY         Review of patient's allergies indicates:  No Known Allergies    No current facility-administered medications on file prior to encounter.     Current Outpatient Medications on File Prior to Encounter   Medication Sig    meloxicam (MOBIC) 7.5 MG tablet Take 1 tablet (7.5 mg total) by mouth once daily.    predniSONE (DELTASONE) 20 MG tablet Two every morning    tiZANidine (ZANAFLEX) 4 MG tablet 1 or 2 @hs for muscle spasm    traMADoL (ULTRAM) 50 mg tablet Take 1 tablet (50 mg total) by mouth every 6 (six) hours.     Family History    None       Tobacco Use    Smoking status: Never    Smokeless tobacco: Never   Substance and Sexual Activity    Alcohol use: Not on file    Drug use: Not on file    Sexual activity: Not on file     Review of Systems   Constitutional:  Negative for activity change and appetite change.   HENT:  Positive for dental problem. Negative for facial swelling, nosebleeds and rhinorrhea.    Eyes:  Negative for pain and itching.   Respiratory:  Positive for shortness of breath. Negative for cough, choking, wheezing and stridor.    Cardiovascular:  Positive for palpitations and leg swelling. Negative for chest pain.   Gastrointestinal:  Positive for abdominal pain and diarrhea. Negative for abdominal distention and constipation.   Endocrine: Negative for cold intolerance and heat intolerance.   Genitourinary:  Negative for difficulty urinating, dysuria and flank pain.   Musculoskeletal:  Positive for arthralgias, gait problem and joint swelling.   Skin:  Negative for pallor and rash.   Allergic/Immunologic: Negative for environmental allergies and food allergies.   Neurological:  Negative for dizziness, syncope, speech difficulty, numbness and headaches.   Psychiatric/Behavioral:  Negative for agitation, decreased concentration and  hallucinations. The patient is not hyperactive.    All other systems reviewed and are negative.  Objective:     Vital Signs (Most Recent):  Temp: 97.6 °F (36.4 °C) (04/22/23 0040)  Pulse: 86 (04/22/23 0010)  Resp: 20 (04/22/23 0111)  BP: (!) 170/89 (04/22/23 0010)  SpO2: 96 % (04/22/23 0010)   Vital Signs (24h Range):  Temp:  [95.7 °F (35.4 °C)-97.6 °F (36.4 °C)] 97.6 °F (36.4 °C)  Pulse:  [81-89] 86  Resp:  [20-26] 20  SpO2:  [88 %-98 %] 96 %  BP: (155-205)/() 170/89     Weight: 106.7 kg (235 lb 3.7 oz)  Body mass index is 35.77 kg/m².    Physical Exam  Vitals reviewed.   Constitutional:       General: He is awake. He is not in acute distress.     Appearance: Normal appearance. He is well-developed and well-groomed. He is morbidly obese. He is ill-appearing.      Interventions: He is sedated.   HENT:      Head: Normocephalic and atraumatic.      Mouth/Throat:      Mouth: Mucous membranes are moist.      Pharynx: Oropharynx is clear.   Eyes:      Conjunctiva/sclera: Conjunctivae normal.      Comments: Contacts bilateral    Cardiovascular:      Rate and Rhythm: Normal rate and regular rhythm.      Pulses: Normal pulses.      Heart sounds: Normal heart sounds.   Pulmonary:      Effort: Pulmonary effort is normal.      Breath sounds: Normal breath sounds.   Abdominal:      Palpations: Abdomen is soft.      Tenderness: There is abdominal tenderness in the right lower quadrant, suprapubic area and left lower quadrant. There is no guarding.   Musculoskeletal:      Left lower leg: Tenderness present.   Skin:     General: Skin is warm and dry.   Neurological:      Mental Status: He is alert and oriented to person, place, and time.   Psychiatric:         Mood and Affect: Mood normal.         Behavior: Behavior normal. Behavior is cooperative.           Significant Labs: All pertinent labs within the past 24 hours have been reviewed.    Significant Imaging: I have reviewed all pertinent imaging results/findings within  the past 24 hours.    Assessment/Plan:     * Embolic infarction  - CT abdomen w/ contrast - Renal infarction  - CTA chest shows bilateral PE  - Cardiology consulted, appreciate assistance   - Telemetry   - Heparin infusion started  - PT, INR, PTT - all WNL  - ProBNP 1607  - Troponin x3: 4786, 4770, pending  - ECHO pending  - Pain medication PRN  - Oxygen 2L NC - SpO2 100%      Bilateral pulmonary embolism  - Same as above       Lactic acidosis  - Lactic acid on admission 8.7  - Repeat lactic acid 8.8      Hyperglycemia, unspecified  - On admission glucose 274  - No known history of diabetes  - HgA1c pending  - SSI & POCT glucose       Obesity (BMI 35.0-39.9 without comorbidity)  Body mass index is 35.77 kg/m². Morbid obesity complicates all aspects of disease management from diagnostic modalities to treatment. Weight loss encouraged and health benefits explained to patient.           VTE Risk Mitigation (From admission, onward)         Ordered     IP VTE HIGH RISK PATIENT  Once         04/22/23 0210     Place sequential compression device  Until discontinued         04/22/23 0210     heparin 25,000 units in dextrose 5% 250 mL (100 units/mL) infusion HIGH INTENSITY nomogram - [facility]  Continuous        Question:  Begin at (in units/kg/hr)  Answer:  18    04/22/23 0020     heparin 25,000 units in dextrose 5% (100 units/ml) IV bolus from bag - ADDITIONAL PRN BOLUS - 60 units/kg  As needed (PRN)        Question:  Heparin Infusion Adjustment (DO NOT MODIFY ANSWER)  Answer:  [email or web address]\epic\Images\Pharmacy\HeparinInfusions\heparin HIGH INTENSITY nomogram for [place] [id number]    04/22/23 0020     heparin 25,000 units in dextrose 5% (100 units/ml) IV bolus from bag - ADDITIONAL PRN BOLUS - 30 units/kg  As needed (PRN)        Question:  Heparin Infusion Adjustment (DO NOT MODIFY ANSWER)  Answer:  \[email or web address]\epic\Images\Pharmacy\HeparinInfusions\heparin HIGH INTENSITY nomogram for [place] [id number]    04/22/23 0020                            Archie Izquierdo MD  Department of Hospital Medicine  [facility]

## 2023-04-22 NOTE — ASSESSMENT & PLAN NOTE
Spoke with cardiology regarding the plan for surgery.  There is some right heart strain from the PE.  Recommends invasive monitoring intraop and postop (CVP, [place])

## 2023-04-22 NOTE — OP NOTE
[facility]     Operative Note    SUMMARY     Date of Procedure: 4/22/2023     Procedure: Procedure(s) (LRB):  CENTRAL LINE, RIGHT INTERNAL JUGULAR VEIN  LAPAROTOMY, EXPLORATORY (N/A)   THROMBECTOMY OF SMA, THROMBECTOMY OF CELIAC ARTERY  CHOLECYSTECTOMY      Surgeon(s) and Role:     * Edmond Clark MD - Primary     * Janet Bliss MD - Co-Surgeon    Assisting Surgeon: None    Pre-Operative Diagnosis: Embolic infarction [I74.9]    Post-Operative Diagnosis: Post-Op Diagnosis Codes:     * Embolic infarction [I74.9]    Anesthesia: General    Technical Procedures Used:  The patient was brought to the operating room and placed in supine position.  General anesthesia was established per anesthesia staff. The right neck was prepped with Betadine and draped in standard fashion. Cook needle was then used to aspirate venous blood from the right IJ under direct visualization by ultrasound. A guidewire was then passed, with ease, into the vein for at least 20 cm. A stab incision was then performed along the guidewire. Following this, the dilator was then passed over the guidewire, holding the guidewire still to ensure coaxial placement.. Subsequently, the catheter was threaded over the guidewire to 17 cm.  The guidewire was then removed, and ports were aspirated easily. The ports were locked with heparinized saline and place. Catheter was secured with interrupted silk suture. Sterile dressing was placed.     The abdomen was prepped and draped in standard fashion.  Long midline incision was then performed and carried down to the level of the fascia.  Fascia was sharply transected and peritoneum was carefully entered.  Bookwalter retractor was obtained and was used to expose the small bowel mesentery as it traversed beneath the transverse mesocolon.  Mesentery was incised, and after identifying the vein, the artery was able to be palpated and isolated proximally and distally.  A transverse  arteriotomy was performed.  Maggie catheters were then passed distally and proximally removing clot.  Flow was then restored to the superior mesenteric artery.  Interrupted 5 0 Prolene was used to close this after flushing with heparinized saline.      Following this, celiac artery exposure was attempted by incising the mesentery medial to the stomach the lesser curve area.  This exposure proved difficult and troublesome bleeding was encountered.  Ultimately the hepatic artery was able to be identified and following proximal distal control, was opened using a transverse arteriotomy.  Maggie catheters were used to remove clot distally and then proximally.  There was a return of a very large amount of blood.  The artery was flushed with heparinized saline, and arteriotomy was then closed using interrupted PDS suture.  Clot was sent to pathology for permanent evaluation.    At this point Surgicel was placed at both anastomosis along with laparotomy pads.  The gallbladder was then addressed.  Due to necrotic ischemic appearance, gallbladder was removed.  Cystic duct was identified and controlled with clips.  Likewise cystic artery was controlled with clips.  The gallbladder was dissected free from the surface of the liver with cautery.  Gallbladder was sent for specimen.  The abdomen was then irrigated, and decision was made to leave the abdomen open.  A abdominal VAC dressing was placed.  The patient was transported back in critical condition to the ICU.      Description of the Findings of the Procedure:  Central line was performed 1st, placing in the right internal jugular vein position, and using ultrasound to confirm patent internal jugular vein to observe vena puncture, and to observe the guidewire within the vein.  Upon entering the abdomen, there was a definite foul odor noted suggestive of gangrene of the bowel.  The liver had an ischemic appearance, as did the stomach small intestines and gallbladder.   Ultimately a cholecystectomy was performed at the end of the procedure.  None of the bowels were resected after restoring flow, and the abdomen was left open.  In regards to the vasculature, there was clot within the SMA that was removed, restoring flow to the intestines.  Subsequent to this there was a very large amount of celiac clot burden which was also removed, restoring flow to the stomach and liver.    Assistant(s): NONE;  DR. ALETHEA AMOR, CO-SURGEON    Complications: No    Estimated Blood Loss (EBL): 300 cc           Implants: * No implants in log *    Specimens: SMA thrombus, Celiac thrombus, Gallbladder    Specimen (24h ago, onward)      None             Condition: Critical,  prognosis very poor    Complications:  None

## 2023-04-22 NOTE — PLAN OF CARE
Problem: Adult Inpatient Plan of Care  Goal: Patient-Specific Goal (Individualized)  Outcome: Ongoing, Progressing     Problem: Adult Inpatient Plan of Care  Goal: Absence of Hospital-Acquired Illness or Injury  Outcome: Ongoing, Progressing     Problem: Adult Inpatient Plan of Care  Goal: Optimal Comfort and Wellbeing  Outcome: Ongoing, Progressing

## 2023-04-22 NOTE — ASSESSMENT & PLAN NOTE
- CT abdomen w/ contrast - Renal infarction  - CTA chest shows bilateral PE  - Cardiology consulted, appreciate assistance   - Telemetry   - Heparin infusion started  - PT, INR, PTT - all WNL  - ProBNP 1607  - Troponin x3: 4786, 4770, pending  - ECHO pending  - Pain medication PRN  - Oxygen 2L NC - SpO2 100%

## 2023-04-22 NOTE — DISCHARGE SUMMARY
[facility] ICU  Pulmonology  Discharge Summary      Patient Name: Drake Barry  MRN: [id number]  Admission Date: 4/21/2023  Hospital Length of Stay: 0 days  Discharge Date and Time:  04/22/2023 2:15 PM  Attending Physician: Bradford Sexton DO   Discharging Provider: Deborah Abel -ACNP  Primary Care Provider: Donovan Broderick III, MD    HPI:  The patient is a 52 year old male that presents to [facility] with abdominal pain and SOB. He has no significant PMHx and states that he takes no medication at home. The history taking is limited due to patient recently receiving fentanyl 100 mcg IV prior to exam. The patient stated that earlier in the day an abrupt onset of abdominal pain prompted him to seek medical attention. The patient points to his lower abdomen when ask where the pain is, especially RLQ. He states that has never happened before previously and that he is generally a healthy individual. The patient and his wife deny any knowledge of a family history of coagulation disorders.      In the ED the patient received morphine 2 mg IV x2, NaCl 0.9% 1L bolus, Aspirin 325 mg PO and Nitro paste. EKG - NSR.  - CT Abdomen preliminary - Lung bases show PE. Bilateral renal wedge-shaped areas of decreased perfusion are consistent with pyelonephritis, or areas of ischemic change from emboli. The aorta appears to have a filling defect extending into the SMA suspicious for thrombus.  - CTA Chest - Bilateral pulmonary emboli. Limited visualization of the upper abdomen demonstrates localized thrombus within the visualized celiac trunk and adjacent anterior abdominal aorta.      The patient will be admitted to the ICU at [facility] for continued care and medical management.        Procedure(s) (LRB):  LAPAROTOMY, EXPLORATORY (N/A)  THROMBECTOMY, ARTERY, SUPERIOR MESENTERIC (N/A)  CHOLECYSTECTOMY  THROMBECTOMY (N/A)    Indwelling Lines/Drains at Time of Discharge:   Lines/Drains/Airways     Central  Venous Catheter Line  Duration           Tunneled Central Line Insertion/Assessment - Triple Lumen  04/22/23  <1 day          Drain  Duration                Urethral Catheter 04/22/23 Non-latex;Straight-tip 16 Fr. <1 day          Airway  Duration                Airway - Non-Surgical 04/22/23 0850 <1 day          Arterial Line  Duration           Arterial Line 04/22/23 0850 Right Radial <1 day                Hospital Course:  Admitted overnight for multiple embolic infarcts. Lactic Acid increased to 15- surgery was consulted and he was taken to the OR  ischemic bowel. He returned on the vent and 3 IV pressors. He was transfused 2 units PRBCs and treated with multiple amps of Bicarb. A 4th pressor was added. His wound vac canister was changed to increased blood loss. He was transferred another 2 units of PRBCs and treated several more bicarbs. Despite all treatment he continued to decline. Wife was at bedside but remained very unrealistic regarding his condition and prognosis. At 1352 he went into asystole and CPR was started. Time of death was pronounced at 1415. Wife remained at bedside during cardiac arrest.   This includes 90 minutes of critical care time spent at bedside evaluating and managing patient excluding procedures. This includes time spent at bedside, reviewing labs, data, xrays, discussions with consultants and monitoring for acute decompensation.          Goals of Care Treatment Preferences:  Code Status: Full Code      Consults (From admission, onward)        Status Ordering Provider     Inpatient consult to General Surgery  Once        Provider:  (Not yet assigned)    Acknowledged DANIEL MCCARTHY     Inpatient consult to Cardiology  Once        Provider:  Vicente Ruiz MD    Completed TAE PERRY          Significant Labs:  All pertinent labs within the past 24 hours have been reviewed.    Significant Imaging:  I have reviewed all pertinent imaging results/findings within the past 24  hours.    Pending Diagnostic Studies:     Procedure Component Value Units Date/Time    EKG 12-lead [[id number]] Collected: 04/21/23 2049    Order Status: Sent Lab Status: In process Updated: 04/22/23 0521    Narrative:      Test Reason : R07.9,    Vent. Rate : 087 BPM     Atrial Rate : 000 BPM     P-R Int : 168 ms          QRS Dur : 080 ms      QT Int : 358 ms       P-R-T Axes : 063 072 -53 degrees     QTc Int : 405 ms     ACUTE MI / ISCHEMIA   Sinus rhythm  Widespread ST-T abnormality suggests myocardial injury/ischemia  Abnormal ECG      Referred By: [email or web address]   SELF           Confirmed By:     EXTRA TUBES [[id number]] Collected: 04/22/23 0755    Order Status: Sent Lab Status: In process Updated: 04/22/23 0756    Specimen: Blood, Venous     Narrative:      The following orders were created for panel order EXTRA TUBES.  Procedure                               Abnormality         Status                     ---------                               -----------         ------                     Red Top Hold[[id number]]                                     In process                 Light Green Top Hold[[id number]]                             In process                 Lavender Top Hold[[id number]]                                In process                 Gold Top Hold[[id number]]                                    In process                 Pink Top Hold[[id number]]                                    In process                   Please view results for these tests on the individual orders.    EXTRA TUBES [[id number]] Collected: 04/21/23 2109    Order Status: Sent Lab Status: In process Updated: 04/21/23 2201    Specimen: Blood, Venous     Narrative:      The following orders were created for panel order EXTRA TUBES.  Procedure                               Abnormality         Status                     ---------                               -----------         ------                     Light Blue Top Hold[[id number]]                               In process                 Red Top Hold[[id number]]                                                                  Please view results for these tests on the individual orders.    Red Top Hold [[id number]]     Order Status: Sent Lab Status: No result     Specimen: Blood, Venous     Surgical Pathology [[id number]] Collected: 23 1142    Order Status: Sent Lab Status: No result     Specimen: Tissue from Artery, Tissue from Thrombus, Tissue from Gallbladder         Final Active Diagnoses:    Diagnosis Date Noted POA    PRINCIPAL PROBLEM:  Embolic infarction [I74.9]  Yes    Acute pulmonary embolism with acute cor pulmonale [I26.09] 2023 Yes    Hyperglycemia, unspecified [R73.9] 2023 Yes    SOFI (acute kidney injury) [N17.9] 2023 Yes    Preoperative cardiovascular examination [Z01.810] 2023 Not Applicable    Obesity (BMI 35.0-39.9 without comorbidity) [E66.9]  Yes    Lactic acidosis [E87.20]  Yes      Problems Resolved During this Admission:       Discharged Condition:     Disposition:     Follow Up:    Patient Instructions:   No discharge procedures on file.  Medications:  None    SANDI Simmons-ACNP  Pulmonology  [facility] ICU

## 2023-04-22 NOTE — ANESTHESIA POSTPROCEDURE EVALUATION
Anesthesia Post Evaluation    Patient: Drake Barry    Procedure(s) Performed: Procedure(s) (LRB):  LAPAROTOMY, EXPLORATORY (N/A)    Final Anesthesia Type: general      Patient location during evaluation: ICU  Patient participation: No - Unable to Participate, Sedation  Level of consciousness: sedated  Post-procedure vital signs: reviewed and not stable  Pain management: adequate  Airway patency: patent  SARWAT mitigation strategies: Multimodal analgesia  PONV status at discharge: No PONV  Anesthetic complications: no      Cardiovascular status: hypotensive and hemodynamically unstable (patient returned to ICU post operatiively on dobutamine, levophed, and vasopressin)  Respiratory status: intubated and ventilator  Hydration status: euvolemic  Follow-up not needed.          Vitals Value Taken Time   BP 96/51 04/22/23 1147   Temp 36.3 °C (97.4 °F) 04/22/23 1147   Pulse 132 04/22/23 1156   Resp 18 04/22/23 1156   SpO2 95 % 04/22/23 1156   Vitals shown include unvalidated device data.      No case tracking events are documented in the log.      Pain/Catherine Score: Pain Rating Prior to Med Admin: 10 (4/22/2023  8:00 AM)  Pain Rating Post Med Admin: 2 (4/22/2023  8:30 AM)

## 2023-04-22 NOTE — ASSESSMENT & PLAN NOTE
Plan laparoscopy to evaluate bowels, with further surgery as indicated by findings.  See section above on lactic acidosis.

## 2023-04-22 NOTE — ASSESSMENT & PLAN NOTE
Body mass index is 35.77 kg/m². Morbid obesity complicates all aspects of disease management from diagnostic modalities to treatment. Weight loss encouraged and health benefits explained to patient.

## 2023-04-22 NOTE — NURSING
Patient recvd to ICU4 A,A,Ox4. C/o severe abdominal pain. Grimacing and moaning. Complete assessment done, wife at bedside.Dr Andres messaged r/t patient pain management and new orders wriiten and implemented. Pt B/p 200 SBP with correlation to pain level

## 2023-04-22 NOTE — HPI
The patient is a 52 year old male with no significant PMHx other than a recent left leg fracture that presents to [facility] with abdominal pain and SOB.  The patient stated that earlier in the day an abrupt onset of abdominal pain prompted him to seek medical attention. The patient points to his lower abdomen when ask where the pain is, especially RLQ. He states that has never happened before previously and that he is generally a healthy individual. The patient and his wife deny any knowledge of a family history of coagulation disorders.     Patient had a left lateral malleolus spiral fracture on  4/10/23. He has been largely non-ambulatory and report left leg swelling and pain.      In the ED the patient received morphine 2 mg IV x2, NaCl 0.9% 1L bolus, Aspirin 325 mg PO and Nitro paste. EKG - NSR.  - CT Abdomen preliminary - Lung bases show PE. Bilateral renal wedge-shaped areas of decreased perfusion are consistent with pyelonephritis, or areas of ischemic change from emboli. The aorta appears to have a filling defect extending into the SMA suspicious for thrombus.  - CTA Chest - Bilateral pulmonary emboli. Limited visualization of the upper abdomen demonstrates localized thrombus within the visualized celiac trunk and adjacent anterior abdominal aorta.      Cardiology consulted for troponin elevation and pulmonary embolus and pre-op evaluation.  Patient has worsening lactate with concern for bowel ischemia in the setting of SMA thrombus.

## 2023-04-22 NOTE — ASSESSMENT & PLAN NOTE
Concern for paradoxical emboli (PE as well as renal infarct and SMA thrombus)  Will need coagulation workup  Will need CAROLYN for PFO/ASD evaluation for possible parodoxical emboli  Will need US DVT left leg given recent fracture

## 2023-04-22 NOTE — NURSING
Received patient from or. Vasopressin, levophed, and dobutamine infusing. Patient on vent. [place] infusing. Levo running max dose. Nurse at bedside monitoring.

## 2023-04-22 NOTE — ASSESSMENT & PLAN NOTE
CTPE with large bilateral PE (R>L)  US dvt pending  RV strain on ECHO and CTPE; severely elevated troponin and ntproBNP  Patient is high risk PE; unable to give lytic therapy given need for surgical exploration for bowel ischemia  Will consider PE thrombectomy if patient remains hemodynamically stable post op.

## 2023-04-22 NOTE — ASSESSMENT & PLAN NOTE
- On admission glucose 274  - No known history of diabetes  - HgA1c pending  - SSI & POCT glucose

## 2023-04-22 NOTE — PROCEDURES
"Drake Barry is a 52 y.o. male patient.    Temp: (!) 91.9 °F (33.3 °C) (04/22/23 1425)  Pulse: (!) 169 (04/22/23 1425)  Resp: 15 (04/22/23 1425)  BP: (!) 147/96 (04/22/23 1400)  SpO2: (!) 71 % (04/22/23 1415)  Weight: 106.6 kg (235 lb) (04/22/23 0800)  Height: 5' 8" (172.7 cm) (04/22/23 0800)       Procedures ACLS    Patient maxed on 4 IV pressors. He went into asystole at 1352. CPR was started. Medications were given per protocol. Wife stayed at bedside. He remained pulseless with each pulse check. Time of death pronounced at 1415     4/22/2023    "

## 2023-04-22 NOTE — SUBJECTIVE & OBJECTIVE
No current facility-administered medications on file prior to encounter.     Current Outpatient Medications on File Prior to Encounter   Medication Sig    meloxicam (MOBIC) 7.5 MG tablet Take 1 tablet (7.5 mg total) by mouth once daily.    predniSONE (DELTASONE) 20 MG tablet Two every morning    tiZANidine (ZANAFLEX) 4 MG tablet 1 or 2 @hs for muscle spasm    traMADoL (ULTRAM) 50 mg tablet Take 1 tablet (50 mg total) by mouth every 6 (six) hours.       Review of patient's allergies indicates:  No Known Allergies    Past Medical History:   Diagnosis Date    Closed fracture of left ankle 04/12/2023    Obesity (BMI 35.0-39.9 without comorbidity)      Past Surgical History:   Procedure Laterality Date    TONSILLECTOMY       Family History    None       Tobacco Use    Smoking status: Never    Smokeless tobacco: Never   Substance and Sexual Activity    Alcohol use: Not on file    Drug use: Not on file    Sexual activity: Not on file     Review of Systems   All other systems reviewed and are negative.  Objective:     Vital Signs (Most Recent):  Temp: 97.9 °F (36.6 °C) (04/22/23 0245)  Pulse: 97 (04/22/23 0345)  Resp: 20 (04/22/23 0604)  BP: (!) 182/100 (04/22/23 0345)  SpO2: 96 % (04/22/23 0500)   Vital Signs (24h Range):  Temp:  [95.7 °F (35.4 °C)-97.9 °F (36.6 °C)] 97.9 °F (36.6 °C)  Pulse:  [81-97] 97  Resp:  [20-42] 20  SpO2:  [88 %-100 %] 96 %  BP: (151-208)/() 182/100     Weight: 106.7 kg (235 lb 3.7 oz)  Body mass index is 35.77 kg/m².    Physical Exam  Cardiovascular:      Rate and Rhythm: Normal rate.      Pulses: Normal pulses.   Pulmonary:      Effort: Respiratory distress present.   Abdominal:      Palpations: Abdomen is soft.      Tenderness: There is abdominal tenderness (diffuse).   Musculoskeletal:         General: No swelling.   Skin:     Coloration: Skin is not jaundiced.   Neurological:      General: No focal deficit present.      Mental Status: He is alert.   Psychiatric:         Mood and Affect:  Mood normal.       Significant Labs:  I have reviewed all pertinent lab results within the past 24 hours.  CBC:   Recent Labs   Lab 04/22/23  0305   WBC 15.24*   RBC 5.23   HGB 14.4   HCT 49.6      MCV 94.8   MCH 27.5   MCHC 29.0*     BMP:   Recent Labs   Lab 04/22/23  0305         K 3.8      CO2 15*   BUN 19*   CREATININE 1.75*   CALCIUM 9.9   MG 2.5*         Significant Diagnostics:  I have reviewed all pertinent imaging results/findings within the past 24 hours.  Per HPI

## 2023-04-22 NOTE — HPI
The patient is a 52 year old male that presents to [facility] with abdominal pain and SOB. He has no significant PMHx and states that he takes no medication at home. The history taking is limited due to patient recently receiving fentanyl 100 mcg IV prior to exam. The patient stated that earlier in the day an abrupt onset of abdominal pain prompted him to seek medical attention. The patient points to his lower abdomen when ask where the pain is, especially RLQ. He states that has never happened before previously and that he is generally a healthy individual. The patient and his wife deny any knowledge of a family history of coagulation disorders.     In the ED the patient received morphine 2 mg IV x2, NaCl 0.9% 1L bolus, Aspirin 325 mg PO and Nitro paste. EKG - NSR.  - CT Abdomen preliminary - Lung bases show PE. Bilateral renal wedge-shaped areas of decreased perfusion are consistent with pyelonephritis, or areas of ischemic change from emboli. The aorta appears to have a filling defect extending into the SMA suspicious for thrombus.  - CTA Chest - Bilateral pulmonary emboli. Limited visualization of the upper abdomen demonstrates localized thrombus within the visualized celiac trunk and adjacent anterior abdominal aorta.     The patient will be admitted to the ICU at [facility] for continued care and medical management.

## 2023-04-22 NOTE — ASSESSMENT & PLAN NOTE
Concern for small bowel ischemia, due to ongoing acidosis  Take to OR for evaluation of intestines, likely thrombectomy of SMA  Discussed recommended plan with the patient and his was to include initial as laparoscopy to evaluate bowel, likely conversion to laparotomy for bowel resection, thrombectomy of the mesenteric vessels.  Risks and benefits include hernia, possible need for further surgery ,likely to remain intubated postoperatively, bleeding, infection and heart or lung complications from anesthesia.  Patient and wife understand, wish to proceed.

## 2023-04-22 NOTE — SUBJECTIVE & OBJECTIVE
Past Medical History:   Diagnosis Date    Closed fracture of left ankle 04/12/2023    Obesity (BMI 35.0-39.9 without comorbidity)        Past Surgical History:   Procedure Laterality Date    TONSILLECTOMY         Review of patient's allergies indicates:  No Known Allergies    No current facility-administered medications on file prior to encounter.     Current Outpatient Medications on File Prior to Encounter   Medication Sig    [DISCONTINUED] meloxicam (MOBIC) 7.5 MG tablet Take 1 tablet (7.5 mg total) by mouth once daily.    [DISCONTINUED] predniSONE (DELTASONE) 20 MG tablet Two every morning    [DISCONTINUED] tiZANidine (ZANAFLEX) 4 MG tablet 1 or 2 @hs for muscle spasm    [DISCONTINUED] traMADoL (ULTRAM) 50 mg tablet Take 1 tablet (50 mg total) by mouth every 6 (six) hours.     Family History    None       Tobacco Use    Smoking status: Never    Smokeless tobacco: Never   Substance and Sexual Activity    Alcohol use: Not on file    Drug use: Not on file    Sexual activity: Not on file     Review of Systems   Constitutional: Positive for malaise/fatigue and night sweats.   Cardiovascular:  Positive for chest pain, dyspnea on exertion and leg swelling. Negative for orthopnea, palpitations and paroxysmal nocturnal dyspnea.   Respiratory:  Positive for shortness of breath.    Gastrointestinal:  Positive for abdominal pain.   All other systems reviewed and are negative.  Objective:     Vital Signs (Most Recent):  Temp: 97.4 °F (36.3 °C) (04/22/23 1147)  Pulse: (!) 130 (04/22/23 1147)  Resp: 12 (04/22/23 1147)  BP: (!) 96/51 (04/22/23 1147)  SpO2: (!) 94 % (04/22/23 1147)   Vital Signs (24h Range):  Temp:  [95.7 °F (35.4 °C)-97.9 °F (36.6 °C)] 97.4 °F (36.3 °C)  Pulse:  [] 130  Resp:  [12-42] 12  SpO2:  [88 %-100 %] 94 %  BP: ()/() 96/51     Weight: 106.6 kg (235 lb)  Body mass index is 35.73 kg/m².    SpO2: (!) 94 %         Intake/Output Summary (Last 24 hours) at 4/22/2023 1150  Last data filed at  4/22/2023 1116  Gross per 24 hour   Intake 1250 ml   Output --   Net 1250 ml       Lines/Drains/Airways       Drain  Duration                  Urethral Catheter 04/22/23 Non-latex;Straight-tip 16 Fr. <1 day              Airway  Duration                  Airway - Non-Surgical 04/22/23 0850 <1 day              Arterial Line  Duration             Arterial Line 04/22/23 0850 Right Radial <1 day              Peripheral Intravenous Line  Duration                  Peripheral IV - Single Lumen 04/21/23 2057 20 G Left Antecubital <1 day         Peripheral IV - Single Lumen 04/22/23 0100 20 G Anterior;Left Forearm <1 day                    Physical Exam  Vitals and nursing note reviewed.   Constitutional:       General: He is in acute distress.      Appearance: He is toxic-appearing. He is not diaphoretic.   HENT:      Head: Normocephalic and atraumatic.      Right Ear: External ear normal.      Left Ear: External ear normal.      Nose: Nose normal.      Mouth/Throat:      Mouth: Mucous membranes are dry.      Pharynx: Oropharynx is clear.   Eyes:      Extraocular Movements: Extraocular movements intact.      Conjunctiva/sclera: Conjunctivae normal.   Cardiovascular:      Rate and Rhythm: Regular rhythm. Tachycardia present.      Pulses: Normal pulses.      Heart sounds: Normal heart sounds.   Pulmonary:      Effort: Pulmonary effort is normal.      Breath sounds: Normal breath sounds.   Abdominal:      General: Abdomen is flat.      Tenderness: There is abdominal tenderness. There is guarding.   Musculoskeletal:         General: Normal range of motion.      Cervical back: Normal range of motion and neck supple.      Right lower leg: No edema.      Left lower leg: Edema present.   Skin:     General: Skin is warm.      Capillary Refill: Capillary refill takes less than 2 seconds.   Neurological:      General: No focal deficit present.      Mental Status: He is alert. Mental status is at baseline.   Psychiatric:         Mood  and Affect: Mood normal.         Behavior: Behavior normal.       Significant Labs: BMP:   Recent Labs   Lab 04/21/23 2106 04/22/23  0305   * 105    144   K 3.4* 3.8    102   CO2 20* 15*   BUN 22* 19*   CREATININE 1.68* 1.75*   CALCIUM 9.6 9.9   MG  --  2.5*   , CMP   Recent Labs   Lab 04/21/23 2106 04/22/23  0305    144   K 3.4* 3.8    102   CO2 20* 15*   * 105   BUN 22* 19*   CREATININE 1.68* 1.75*   CALCIUM 9.6 9.9   PROT 9.0*  --    ALBUMIN 3.9  --    BILITOT 0.7  --    ALKPHOS 133*  --    AST 56*  --    ALT 47  --    ANIONGAP 21* 31*   , CBC   Recent Labs   Lab 04/21/23 2106 04/22/23 0305 04/22/23  0937 04/22/23  1049   WBC 8.31 15.24*  --   --    HGB 13.6 14.4  --   --    HCT 44.3 49.6   < > 25*    158  --   --     < > = values in this interval not displayed.   , INR   Recent Labs   Lab 04/21/23 2109   INR 1.18   , Lipid Panel No results for input(s): CHOL, HDL, LDLCALC, TRIG, CHOLHDL in the last 48 hours., Troponin No results for input(s): TROPONINI in the last 48 hours., and All pertinent lab results from the last 24 hours have been reviewed.    Significant Imaging: CT scan: ABP  Thrombus/embolus within the lumen of the common origin of the celiac and superior mesenteric arteries with extension into both the celiac and SMA.  There is also thrombus extending into the lumen of the abdominal aorta.     Presumed small bilateral renal infarcts bilaterally    CTPE   Bilateral pulmonary thromboembolism present more extensive on the right extending into to both upper and lower branches bilateral.     Thrombus is seen in the abdominal aorta extending into the superior mesenteric artery partially visualized.  No other thrombus or other abnormality is identified in the pulmonary arteries or veins.  The pulmonary vessel caliber is within normal limits.  The heart, mediastinum and great vessels appear within normal limits.     Lung parenchyma shows no evidence of airspace  disease or abnormal density.  No effusion or pneumothorax is present.      , Echocardiogram: Transthoracic echo (TTE) complete (Cupid Only):   Results for orders placed or performed during the hospital encounter of 04/21/23   Echo   Result Value Ref Range    BSA 2.26 m2    AORTIC VALVE CUSP SEPERATION 2.00 cm    LVIDd 3.64 3.5 - 6.0 cm    IVS 1.16 (A) 0.6 - 1.1 cm    Posterior Wall 1.11 (A) 0.6 - 1.1 cm    LVIDs 2.56 2.1 - 4.0 cm    FS 30 28 - 44 %    LV mass 132.22 g    LA size 3.10 cm    RVDD 2.65 cm    RA area 10.9 cm2    Left Ventricle Relative Wall Thickness 0.61 cm    AV mean gradient 39 mmHg    E wave deceleration time 158.00 msec    LVOT diameter 2.10 cm    LVOT area 3.5 cm2    Ao peak milan 1.6 m/s    Ao VTI 67.44 cm    AV peak gradient 10 mmHg    MV Peak E [place] 0.66 m/s    TR Max [place] 4.06 m/s    LV Systolic Volume 23.68 mL    LV Systolic Volume Index 10.8 mL/m2    LV Diastolic Volume 55.89 mL    LV Diastolic Volume Index 25.52 mL/m2    LV Mass Index 60 g/m2    Triscuspid Valve Regurgitation Peak Gradient 66 mmHg    EF 60 %    Narrative    · The left ventricle is normal in size with normal systolic function.  · The estimated ejection fraction is 60%.  · Moderate right ventricular enlargement with moderately reduced right   ventricular systolic function.  · Moderate right atrial enlargement.  · Mild tricuspid regurgitation.  · Mild-to-moderate mitral regurgitation.  · There is pulmonary hypertension.  · ECHO consistent with RV strain in the pulmonary embolus      , and EKG:   Results for orders placed or performed during the hospital encounter of 04/21/23   EKG 12-lead    Collection Time: 04/21/23  8:49 PM    Narrative    Test Reason : R07.9,    Vent. Rate : 087 BPM     Atrial Rate : 000 BPM     P-R Int : 168 ms          QRS Dur : 080 ms      QT Int : 358 ms       P-R-T Axes : 063 072 -53 degrees     QTc Int : 405 ms     ACUTE MI / ISCHEMIA   Sinus rhythm  Widespread ST-T abnormality suggests myocardial  injury/ischemia  Abnormal ECG      Referred By: [email or web address]   SELF           Confirmed By:

## 2023-04-22 NOTE — ANESTHESIA PROCEDURE NOTES
Arterial    Diagnosis: SMA occlusion    Patient location during procedure: done in OR  Procedure start time: 4/22/2023 8:50 AM  Timeout: 4/22/2023 8:49 AM  Procedure end time: 4/22/2023 8:55 AM    Staffing  Authorizing Provider: José Alex DO  Performing Provider: José Alex DO    Anesthesiologist was present at the time of the procedure.    Preanesthetic Checklist  Completed: patient identified, IV checked, site marked, risks and benefits discussed, surgical consent, monitors and equipment checked, pre-op evaluation, timeout performed and anesthesia consent givenArterial  Skin Prep: chlorhexidine gluconate  Local Infiltration: none  Orientation: right  Location: radial    Catheter Size: 20 G  Catheter placement by Ultrasound guidance. Heme positive aspiration all ports.   Vessel Caliber: small, patent  Needle advanced into vessel with real time Ultrasound guidance.  Sterile sheath used.Insertion Attempts: 1  Assessment  Dressing: sutured in place and taped

## 2023-04-22 NOTE — ANESTHESIA PROCEDURE NOTES
Intubation    Date/Time: 4/22/2023 8:50 AM  Performed by: Mitchell Blackwell CRNA  Authorized by: José Alex DO     Intubation:     Induction:  Intravenous    Intubated:  Postinduction    Mask Ventilation:  Easy mask    Attempts:  2    Attempted By:  CRNA    Method of Intubation:  Direct    Blade:  Steve 4    Laryngeal View Grade: Grade IIA - cords partially seen      Method of Intubation (2nd Attempt):  Direct    Blade (2nd Attempt):  Steve 4    Laryngeal View Grade (2nd Attempt): Grade IIa - cords partially seen      Difficult Airway Encountered?: No      Complications:  None    Airway Device:  Oral endotracheal tube    Airway Device Size:  8.0    Style/Cuff Inflation:  Cuffed    Inflation Amount (mL):  8    Tube secured:  22    Secured at:  The lips    Placement Verified By:  Capnometry    Complicating Factors:  None    Findings Post-Intubation:  BS equal bilateral and atraumatic/condition of teeth unchanged

## 2023-04-22 NOTE — ED PROVIDER NOTES
Encounter Date: 4/21/2023    SCRIBE #1 NOTE: I, Ally Beatty, am scribing for, and in the presence of,  LAURI Murray. I have scribed the following portions of the note - the EKG reading.     History     Chief Complaint   Patient presents with    Abdominal Pain    Shortness of Breath     52 year old male presents to ED with complaint of abdominal pain and shortness of breath. Family states pain started 30 minutes PTA. He stated he felt a burning to his lower abdomen and was having difficulty breathing. Patient endorsed pain was going into his chest. Denies pertinent medical history. Family states he attempted to drink apple juice that was ineffective. Denies fever, chills, nausea/vomiting, diarrhea.     The history is provided by the patient and a relative.   Review of patient's allergies indicates:  No Known Allergies  No past medical history on file.  No past surgical history on file.  No family history on file.  Social History     Tobacco Use    Smoking status: Never    Smokeless tobacco: Never     Review of Systems   Constitutional:  Negative for chills and fever.   Respiratory:  Positive for shortness of breath. Negative for cough and stridor.    Cardiovascular:  Positive for chest pain. Negative for palpitations.   Gastrointestinal:  Positive for abdominal pain. Negative for constipation, nausea and vomiting.   Genitourinary:  Negative for difficulty urinating and dysuria.   Musculoskeletal:  Negative for arthralgias and gait problem.   Skin:  Negative for color change and wound.   All other systems reviewed and are negative.    Physical Exam     Initial Vitals   BP Pulse Resp Temp SpO2   04/21/23 2040 04/21/23 2040 04/21/23 2040 04/21/23 2052 04/21/23 2040   (!) 173/113 88 (!) 22 (!) 95.7 °F (35.4 °C) (!) 88 %      MAP       --                Physical Exam    Nursing note and vitals reviewed.  Constitutional: He appears well-developed and well-nourished.   HENT:   Head: Normocephalic and  atraumatic.   Eyes: EOM are normal. Pupils are equal, round, and reactive to light.   Neck: Neck supple.   Normal range of motion.  Cardiovascular:  Normal rate and regular rhythm.           No murmur heard.  Pulmonary/Chest: He has no wheezes. He has no rhonchi.   Abdominal: Abdomen is soft. He exhibits no distension. There is abdominal tenderness in the right lower quadrant and left lower quadrant.   Musculoskeletal:         General: No tenderness or edema.      Cervical back: Normal range of motion and neck supple.      Left ankle: Decreased range of motion.        Legs:      Lymphadenopathy:     He has no cervical adenopathy.   Neurological: He is alert and oriented to person, place, and time. No cranial nerve deficit or sensory deficit.   Skin: Skin is warm and dry. Capillary refill takes less than 2 seconds.   Psychiatric: He has a normal mood and affect. Thought content normal.       Medical Screening Exam   See Full Note    ED Course   Procedures  Labs Reviewed   COMPREHENSIVE METABOLIC PANEL - Abnormal; Notable for the following components:       Result Value    Potassium 3.4 (*)     CO2 20 (*)     Anion Gap 21 (*)     Glucose 274 (*)     BUN 22 (*)     Creatinine 1.68 (*)     Total Protein 9.0 (*)     Globulin 5.1 (*)     Alk Phos 133 (*)     AST 56 (*)     eGFR 49 (*)     All other components within normal limits   TROPONIN I - Abnormal; Notable for the following components:    Troponin I High Sensitivity 4,786.4 (*)     All other components within normal limits   NT-PRO NATRIURETIC PEPTIDE - Abnormal; Notable for the following components:    ProBNP 1,617 (*)     All other components within normal limits   CBC WITH DIFFERENTIAL - Abnormal; Notable for the following components:    MCHC 30.7 (*)     Eosinophils % 0.2 (*)     All other components within normal limits   LACTIC ACID, PLASMA - Abnormal; Notable for the following components:    Lactic Acid 8.7 (*)     All other components within normal limits    URINALYSIS, REFLEX TO URINE CULTURE - Abnormal; Notable for the following components:    Protein,  (*)     Blood, UA 1+ (*)     All other components within normal limits   URINALYSIS, MICROSCOPIC - Abnormal; Notable for the following components:    RBC, UA 5-10 (*)     Bacteria, UA Few (*)     Squamous Epithelial Cells, UA Few (*)     Mucus, UA Moderate (*)     Coarse Granular Casts, UA 0-2 (*)     All other components within normal limits   PROTIME-INR - Normal   APTT - Normal   CBC W/ AUTO DIFFERENTIAL    Narrative:     The following orders were created for panel order CBC auto differential.  Procedure                               Abnormality         Status                     ---------                               -----------         ------                     CBC with Differential[[id number]]        Abnormal            Final result                 Please view results for these tests on the individual orders.   EXTRA TUBES    Narrative:     The following orders were created for panel order EXTRA TUBES.  Procedure                               Abnormality         Status                     ---------                               -----------         ------                     Light Blue Top Hold[[id number]]                              In process                 Red Top Hold[[id number]]                                                                  Please view results for these tests on the individual orders.   LIGHT BLUE TOP HOLD   RED TOP HOLD   TROPONIN I   LACTIC ACID, PLASMA   SARS-COV-2 RNA AMPLIFICATION, QUAL     EKG Readings: (Independently Interpreted)   Initial Reading: Ischemia. Rhythm: Normal Sinus Rhythm. Heart Rate: 87.   Interpreted by Dr. Espinoza at 2049.      Imaging Results              CT Abdomen Pelvis With Contrast (In process)                      CTA Chest Non-Coronary (PE Studies) (In process)                      XR ABDOMEN, ACUTE 2 OR MORE VIEWS WITH CHEST (In process)                       Medications   aspirin tablet 325 mg (325 mg Oral Given 4/21/23 2108)   morphine injection 2 mg (2 mg Intravenous Given 4/21/23 2130)   nitroGLYCERIN 2% TD oint ointment 1 inch (1 inch Topical (Top) Given 4/21/23 2155)   sodium chloride 0.9% bolus 1,000 mL 1,000 mL (1,000 mLs Intravenous New Bag 4/21/23 2207)   morphine injection 2 mg (2 mg Intravenous Given 4/21/23 2247)   iopamidoL (ISOVUE-370) injection 100 mL (100 mLs Intravenous Given 4/21/23 2301)     Medical Decision Making:   Initial Assessment:   Abdominal pain  Shortness of breath  Clinical Tests:   Lab Tests: Ordered and Reviewed  Radiological Study: Ordered and Reviewed  Medical Tests: Ordered and Reviewed  ED Management:  [facility]    Patient presents for emergent evaluation of acute abdominal pain that poses a threat to life and/or bodily function.    In the ED patient found to have acute NSTEMI, abdominal pain, lactic acidosis, SOFI.    I ordered labs and personally reviewed them.  Labs significant for lactic 8.7, troponin 4786.4, BNP 1618, BUN/Creat 1.68/28, glucose 274  I ordered X-rays and personally reviewed them and reviewed the radiologist interpretation.  Xray significant for no acute processes.    I ordered EKG and personally reviewed it.  EKG significant for sinus rhythm; widespread ST abnormality; rate 78.    I ordered CT scan and personally reviewed it and reviewed the radiologist interpretation.  CT significant for pending read for CT PE and CT Abd/Pelvis.      Admit MDM  I discussed the patient presentation labs, ekg, X-rays, CT findings with the consultant for cardiology (speciality).    Patient was managed in the ED with IV morphine; PO ASA, topical NTG; IV heparin.    The response to treatment was fair.    Patient discussed with Dr. Andres for admission          Attending Attestation:           Physician Attestation for Scribe:  Physician Attestation Statement for Scribe #1: I, LAURI Murray, reviewed documentation, as  scribed by Ally Beatty in my presence, and it is both accurate and complete.                       Clinical Impression:   Final diagnoses:  [R07.9] Chest pain  [R10.9] Abdominal pain, unspecified abdominal location (Primary)  [R06.02] SOB (shortness of breath)  [I21.4] NSTEMI (non-ST elevated myocardial infarction)  [N17.9] SOFI (acute kidney injury)  [E87.20] Lactic acidosis        ED Disposition Condition    Admit Stable                LAURI Neff  04/21/23 2444       LAURI Neff  04/21/23 4475

## 2023-04-22 NOTE — HOSPITAL COURSE
Admitted overnight for multiple embolic infarcts. Lactic Acid increased to 15- surgery was consulted and he was taken to the OR  ischemic bowel. He returned on the vent and 3 IV pressors. He was transfused 2 units PRBCs and treated with multiple amps of Bicarb. A 4th pressor was added. His wound vac canister was changed to increased blood loss. He was transferred another 2 units of PRBCs and treated several more bicarbs. Despite all treatment he continued to decline. Wife was at bedside but remained very unrealistic regarding his condition and prognosis. At 1352 he went into asystole and CPR was started. Time of death was pronounced at 1415. Wife remained at bedside during cardiac arrest.   This includes 90 minutes of critical care time spent at bedside evaluating and managing patient excluding procedures. This includes time spent at bedside, reviewing labs, data, xrays, discussions with consultants and monitoring for acute decompensation.

## 2023-04-23 NOTE — OP NOTE
[facility] ICU  Surgery Department  Operative Note    SUMMARY     Date of Procedure: 4/22/2023     Procedure: Procedure(s) (LRB):  LAPAROTOMY, EXPLORATORY (N/A)  THROMBECTOMY, ARTERY, SUPERIOR MESENTERIC (N/A)  CHOLECYSTECTOMY  THROMBECTOMY (N/A)     Surgeon(s) and Role:     * Edmond Clark MD - Primary     * Janet Bliss MD - Co-Surgeon      I was asked Dr. 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       Assisting Surgeon: None    Pre-Operative Diagnosis: Embolic infarction [I74.9]    Post-Operative Diagnosis: Post-Op Diagnosis Codes:     * Embolic infarction [I74.9]    Anesthesia: General    Operative Findings (including complications, if any):  Celiac artery occlusion SMA occlusion    Description of Technical Procedures:  I was physically present from the beginning of this case until closure of the incision assisted in dissection obtain control the hepatic artery exposure of the celiac axis exposure of the superior mesenteric artery at the root of the mesentery assistant in thrombectomy and closure of these arteriotomies.  Two surgeons were absolutely necessary in this case due to complexity and difficulty associated with this procedure.  Please see Dr. Clark's note    Significant Surgical Tasks Conducted by the Assistant(s), if Applicable:  All significant surgical tasks were carried out by Dr. Clark and Dr. Bliss    Estimated Blood Loss (EBL): * No values recorded between 4/22/2023  9:09 AM and 4/22/2023 11:31 AM *500cc           Implants: * No implants in log *    Specimens:   Specimen (24h ago, onward)       Start     Ordered    04/22/23 1143  Surgical Pathology  RELEASE UPON ORDERING         04/22/23 1143                            Condition: Critical    Disposition: ICU - intubated and critically ill.    Attestation: I was present for the entire procedure.

## 2023-04-24 LAB
HCO3 UR-SCNC: 24.3 MMOL/L (ref 21–28)
PCO2 BLDA: 86 MMHG (ref 35–48)
PH SMN: 7.06 [PH] (ref 7.35–7.45)
PO2 BLDA: 48 MMHG (ref 83–108)
POC BASE EXCESS: -7.6 MMOL/L (ref -2–3)
POC SATURATED O2: 63 % (ref 95–98)

## 2023-04-25 LAB
ESTROGEN SERPL-MCNC: NORMAL PG/ML
INSULIN SERPL-ACNC: NORMAL U[IU]/ML
LAB AP GROSS DESCRIPTION: NORMAL
LAB AP LABORATORY NOTES: NORMAL
T3RU NFR SERPL: NORMAL %

## 2023-04-26 LAB
POC ACTIVATED CLOTTING TIME K: 233 SEC
POC ACTIVATED CLOTTING TIME K: NORMAL

## (undated) DEVICE — SPONGE LAP XRAY DTECT 18X18IN

## (undated) DEVICE — BLADE ELECTRO EDGE INSULATED

## (undated) DEVICE — APPLICATOR CHLORAPREP ORN 26ML

## (undated) DEVICE — APPLIER LIGACLIP LG 13IN

## (undated) DEVICE — APPLIER LIGACLIP SM 9.38IN

## (undated) DEVICE — SUT 1 48IN PDS II VIO MONO

## (undated) DEVICE — TRAY CATH FOL SIL URIMTR 16FR

## (undated) DEVICE — SUT PROLENE 6-0 BV-1 30IN

## (undated) DEVICE — SEALER LIGASURE IMPACT 18CM

## (undated) DEVICE — CANISTER INVIA LIBERTY 800ML

## (undated) DEVICE — GLOVE PROTEXIS PI SYN SURG 7.5

## (undated) DEVICE — CATH EMB SPR TIP 3F 40CM

## (undated) DEVICE — Device

## (undated) DEVICE — VESSEL LOOPS

## (undated) DEVICE — HEMOSTAT SURGICEL 4X8IN

## (undated) DEVICE — GLOVE PROTEXIS PI SYN SURG 7

## (undated) DEVICE — SUT SILK 2.0 BLK 18

## (undated) DEVICE — SOL NACL IRR 1000ML BTL

## (undated) DEVICE — SUT SILK 3-0 SH DETACH 30IN

## (undated) DEVICE — GLOVE PROTEXIS PI SYN SURG 8.0

## (undated) DEVICE — CATH EMBOLECTOMY 2F 60CM

## (undated) DEVICE — DECANTER FLUID TRNSF WHITE 9IN

## (undated) DEVICE — SUT PROLENE 5-0 36IN C-1

## (undated) DEVICE — GLOVE PROTEXIS PI SYN SURG 6.5

## (undated) DEVICE — PROBE DOPPLER DISP STRAIGHT 8MHZ

## (undated) DEVICE — KIT BASIC RUSH

## (undated) DEVICE — TUBING INVIA DL QC

## (undated) DEVICE — APPLIER LIGACLIP MED 11IN

## (undated) DEVICE — CATH EMBOLECTOMY 4F REGULAR

## (undated) DEVICE — SUT PROLENE 6-0 C-1 30IN BL

## (undated) DEVICE — SYR 50ML CATH TIP

## (undated) DEVICE — GOWN NONREINF SET-IN SLV 2XL